# Patient Record
Sex: FEMALE | Race: WHITE | NOT HISPANIC OR LATINO | Employment: FULL TIME | URBAN - METROPOLITAN AREA
[De-identification: names, ages, dates, MRNs, and addresses within clinical notes are randomized per-mention and may not be internally consistent; named-entity substitution may affect disease eponyms.]

---

## 2023-03-10 ENCOUNTER — TELEMEDICINE (OUTPATIENT)
Dept: PRIMARY CARE | Facility: CLINIC | Age: 53
End: 2023-03-10
Payer: COMMERCIAL

## 2023-03-10 DIAGNOSIS — Q38.3 TONGUE ABNORMALITY: Primary | ICD-10-CM

## 2023-03-10 PROBLEM — I10 HYPERTENSION: Status: ACTIVE | Noted: 2023-03-10

## 2023-03-10 PROBLEM — H52.13 BILATERAL MYOPIA: Status: ACTIVE | Noted: 2023-03-10

## 2023-03-10 PROBLEM — R31.9 HEMATURIA: Status: ACTIVE | Noted: 2023-03-10

## 2023-03-10 PROBLEM — H57.04: Status: ACTIVE | Noted: 2023-03-10

## 2023-03-10 PROBLEM — E78.5 HYPERLIPIDEMIA: Status: ACTIVE | Noted: 2023-03-10

## 2023-03-10 PROBLEM — H52.203 ASTIGMATISM, BILATERAL: Status: ACTIVE | Noted: 2023-03-10

## 2023-03-10 PROBLEM — Q14.1 CONGENITAL HYPERTROPHY OF RETINAL PIGMENT EPITHELIUM: Status: ACTIVE | Noted: 2023-03-10

## 2023-03-10 PROBLEM — G54.0 NEUROGENIC THORACIC OUTLET SYNDROME: Status: ACTIVE | Noted: 2023-03-10

## 2023-03-10 PROBLEM — K21.9 LARYNGOPHARYNGEAL REFLUX (LPR): Status: ACTIVE | Noted: 2023-03-10

## 2023-03-10 PROBLEM — M99.09 SEGMENTAL AND SOMATIC DYSFUNCTION: Status: ACTIVE | Noted: 2023-03-10

## 2023-03-10 PROBLEM — G56.03 CARPAL TUNNEL SYNDROME, BILATERAL UPPER LIMBS: Status: ACTIVE | Noted: 2023-03-10

## 2023-03-10 PROBLEM — M99.01 CERVICOTHORACIC SOMATIC DYSFUNCTION: Status: ACTIVE | Noted: 2023-03-10

## 2023-03-10 PROBLEM — S03.40XA TMJ (SPRAIN OF TEMPOROMANDIBULAR JOINT): Status: ACTIVE | Noted: 2023-03-10

## 2023-03-10 PROBLEM — H35.9 RETINAL PIGMENT EPITHELIUM ABNORMALITY: Status: ACTIVE | Noted: 2023-03-10

## 2023-03-10 PROBLEM — R13.19 ESOPHAGEAL DYSPHAGIA: Status: ACTIVE | Noted: 2023-03-10

## 2023-03-10 PROBLEM — M54.2 CERVICALGIA: Status: ACTIVE | Noted: 2023-03-10

## 2023-03-10 PROCEDURE — 99213 OFFICE O/P EST LOW 20 MIN: CPT | Performed by: STUDENT IN AN ORGANIZED HEALTH CARE EDUCATION/TRAINING PROGRAM

## 2023-03-10 RX ORDER — BUPROPION HYDROCHLORIDE 300 MG/1
1 TABLET ORAL DAILY
COMMUNITY
Start: 2018-01-11 | End: 2023-12-19 | Stop reason: WASHOUT

## 2023-03-10 RX ORDER — LOSARTAN POTASSIUM 50 MG/1
1 TABLET ORAL DAILY
COMMUNITY
Start: 2015-01-27 | End: 2023-08-07 | Stop reason: SDUPTHER

## 2023-03-10 RX ORDER — ONABOTULINUMTOXINA 100 [USP'U]/1
1 INJECTION, POWDER, LYOPHILIZED, FOR SOLUTION INTRADERMAL; INTRAMUSCULAR ONCE
COMMUNITY
Start: 2022-01-26

## 2023-03-10 RX ORDER — ATORVASTATIN CALCIUM 10 MG/1
1 TABLET, FILM COATED ORAL DAILY
COMMUNITY
Start: 2019-07-29 | End: 2024-01-09 | Stop reason: ALTCHOICE

## 2023-03-10 RX ORDER — NYSTATIN 100000 [USP'U]/ML
5 SUSPENSION ORAL 3 TIMES DAILY
Qty: 280 ML | Refills: 0 | Status: SHIPPED | OUTPATIENT
Start: 2023-03-10 | End: 2023-03-24

## 2023-03-10 RX ORDER — LEVONORGESTREL 52 MG/1
1 INTRAUTERINE DEVICE INTRAUTERINE ONCE
COMMUNITY

## 2023-03-10 ASSESSMENT — ENCOUNTER SYMPTOMS
FATIGUE: 0
APPETITE CHANGE: 0
VOICE CHANGE: 0
CHEST TIGHTNESS: 0
SORE THROAT: 0
FEVER: 0
UNEXPECTED WEIGHT CHANGE: 0
SHORTNESS OF BREATH: 0
FACIAL SWELLING: 0
ACTIVITY CHANGE: 0
TROUBLE SWALLOWING: 0
CHOKING: 0

## 2023-03-10 NOTE — PROGRESS NOTES
Subjective   Patient ID: Eveline Ruff is a 52 y.o. female who presents for No chief complaint on file..    HPI     53 yo female presents with 1 week of white coating on tongue    Is not taking any steroids  No new medications  Is not immunocompromised  No itching or pain  No dental concerns    Review of Systems   Constitutional:  Negative for activity change, appetite change, fatigue, fever and unexpected weight change.   HENT:  Negative for facial swelling, mouth sores, sore throat, trouble swallowing and voice change.         White tongue   Eyes:  Negative for visual disturbance.   Respiratory:  Negative for choking, chest tightness and shortness of breath.    Allergic/Immunologic: Negative for immunocompromised state.       Objective   There were no vitals taken for this visit.    Physical Exam  Constitutional:       Appearance: Normal appearance.   HENT:      Mouth/Throat:      Pharynx: No oropharyngeal exudate or posterior oropharyngeal erythema.      Comments: Small white coloring to mid posterior tongue  Patient is not able to scrape off  Pulmonary:      Effort: Pulmonary effort is normal. No respiratory distress.   Neurological:      General: No focal deficit present.      Mental Status: She is alert and oriented to person, place, and time.   Psychiatric:         Mood and Affect: Mood normal.         Behavior: Behavior normal.       Assessment/Plan     1. Tongue abnormality  Discussed with patient trial of nystatin to clear if this is indeed thrush, difficult with limited exam and poor video quality to see   Recommend follow up for in person eval if symptoms persist  Denies any systemic symptoms like fevers, weight loss, pain, decreased appetite  - nystatin (Mycostatin) 100,000 unit/mL suspension; Take 5 mL (500,000 Units) by mouth in the morning and 5 mL (500,000 Units) in the evening and 5 mL (500,000 Units) before bedtime. Do all this for 14 days. Swish in mouth and swallow..  Dispense: 280 mL; Refill:  0

## 2023-03-15 ENCOUNTER — OFFICE VISIT (OUTPATIENT)
Dept: PRIMARY CARE | Facility: CLINIC | Age: 53
End: 2023-03-15
Payer: COMMERCIAL

## 2023-03-15 VITALS
SYSTOLIC BLOOD PRESSURE: 138 MMHG | DIASTOLIC BLOOD PRESSURE: 77 MMHG | BODY MASS INDEX: 24.35 KG/M2 | TEMPERATURE: 98.8 F | WEIGHT: 129 LBS | HEART RATE: 100 BPM | HEIGHT: 61 IN

## 2023-03-15 DIAGNOSIS — B37.0 ORAL THRUSH: Primary | ICD-10-CM

## 2023-03-15 PROCEDURE — 3075F SYST BP GE 130 - 139MM HG: CPT | Performed by: INTERNAL MEDICINE

## 2023-03-15 PROCEDURE — 3078F DIAST BP <80 MM HG: CPT | Performed by: INTERNAL MEDICINE

## 2023-03-15 PROCEDURE — 1036F TOBACCO NON-USER: CPT | Performed by: INTERNAL MEDICINE

## 2023-03-15 PROCEDURE — 99213 OFFICE O/P EST LOW 20 MIN: CPT | Performed by: INTERNAL MEDICINE

## 2023-03-15 ASSESSMENT — ENCOUNTER SYMPTOMS
WOUND: 0
ADENOPATHY: 0
VOMITING: 0
PALPITATIONS: 0
HALLUCINATIONS: 0
NECK PAIN: 0
BLOOD IN STOOL: 0
EYE PAIN: 0
TROUBLE SWALLOWING: 0
CHEST TIGHTNESS: 0
FEVER: 0
NAUSEA: 0
DIZZINESS: 0
STRIDOR: 0
SORE THROAT: 0
PHOTOPHOBIA: 0
SHORTNESS OF BREATH: 0
APPETITE CHANGE: 0
DIARRHEA: 0
SPEECH DIFFICULTY: 0
FACIAL ASYMMETRY: 0
ABDOMINAL PAIN: 0
SINUS PAIN: 0
POLYPHAGIA: 0
CONSTIPATION: 0
HEADACHES: 0
ACTIVITY CHANGE: 0
DYSURIA: 0
VOICE CHANGE: 0
NERVOUS/ANXIOUS: 0
WHEEZING: 0
CONFUSION: 0
WEAKNESS: 0
UNEXPECTED WEIGHT CHANGE: 0
SLEEP DISTURBANCE: 0
SEIZURES: 0
FLANK PAIN: 0
MYALGIAS: 0
FATIGUE: 0
BACK PAIN: 0
NUMBNESS: 0
POLYDIPSIA: 0

## 2023-03-15 ASSESSMENT — PAIN SCALES - GENERAL: PAINLEVEL: 0-NO PAIN

## 2023-03-15 NOTE — PROGRESS NOTES
"Subjective   Patient ID: Eveline Ruff is a 53 y.o. female who presents for Thrush.    HPI   53 year old female who consulted telemedicine few days ago and was treated with Nystatin for oral thrush. She never had it before and presented today for follow up to make sure there is no problem left.    Review of Systems   Constitutional:  Negative for activity change, appetite change, fatigue, fever and unexpected weight change.   HENT:  Negative for dental problem, ear discharge, hearing loss, nosebleeds, postnasal drip, sinus pain, sore throat, trouble swallowing and voice change.    Eyes:  Negative for photophobia, pain and visual disturbance.   Respiratory:  Negative for chest tightness, shortness of breath, wheezing and stridor.    Cardiovascular:  Negative for chest pain, palpitations and leg swelling.   Gastrointestinal:  Negative for abdominal pain, blood in stool, constipation, diarrhea, nausea and vomiting.   Endocrine: Negative for polydipsia, polyphagia and polyuria.   Genitourinary:  Negative for decreased urine volume, dyspareunia, dysuria, flank pain and urgency.   Musculoskeletal:  Negative for back pain, gait problem, myalgias and neck pain.   Skin:  Negative for rash and wound.   Allergic/Immunologic: Negative for environmental allergies and food allergies.   Neurological:  Negative for dizziness, seizures, syncope, facial asymmetry, speech difficulty, weakness, numbness and headaches.   Hematological:  Negative for adenopathy.   Psychiatric/Behavioral:  Negative for behavioral problems, confusion, hallucinations, sleep disturbance and suicidal ideas. The patient is not nervous/anxious.        Objective   /77 (BP Location: Right arm, Patient Position: Sitting, BP Cuff Size: Adult)   Pulse 100   Temp 37.1 °C (98.8 °F)   Ht 1.549 m (5' 1\")   Wt 58.5 kg (129 lb)   BMI 24.37 kg/m²     Physical Exam  Constitutional:       Appearance: Normal appearance.   HENT:      Mouth/Throat:      Mouth: " Mucous membranes are moist.      Pharynx: No oropharyngeal exudate.   Eyes:      Pupils: Pupils are equal, round, and reactive to light.   Cardiovascular:      Rate and Rhythm: Normal rate.   Pulmonary:      Effort: Pulmonary effort is normal.   Neurological:      General: No focal deficit present.      Mental Status: She is alert and oriented to person, place, and time.   Psychiatric:         Mood and Affect: Mood normal.         Behavior: Behavior normal.         Thought Content: Thought content normal.         Judgment: Judgment normal.     Oropharyngeal exam is suggestive of slightly erythematous tongue but it can be normal as I didn't evaluate patient in past. No white coating on the tongue and few mucosal erosion spot bilaterally on lateral side of tongue but they are not ulcer and can be due to left over erosion from recent thrush.    Assessment/Plan   Problem List Items Addressed This Visit    None  Visit Diagnoses       Oral thrush    -  Primary     Patient asked to do Nystatin for 3 days more but it seems to be healed up and mucosal erosion will improve in few days but I cannot see anything concerning at this time.

## 2023-05-13 LAB — URINE CULTURE: NORMAL

## 2023-05-19 LAB
ANION GAP IN SER/PLAS: 12 MMOL/L (ref 10–20)
CALCIUM (MG/DL) IN SER/PLAS: 10.1 MG/DL (ref 8.6–10.6)
CARBON DIOXIDE, TOTAL (MMOL/L) IN SER/PLAS: 29 MMOL/L (ref 21–32)
CHLORIDE (MMOL/L) IN SER/PLAS: 104 MMOL/L (ref 98–107)
CREATININE (MG/DL) IN SER/PLAS: 0.84 MG/DL (ref 0.5–1.05)
GFR FEMALE: 83 ML/MIN/1.73M2
GLUCOSE (MG/DL) IN SER/PLAS: 81 MG/DL (ref 74–99)
POTASSIUM (MMOL/L) IN SER/PLAS: 4.2 MMOL/L (ref 3.5–5.3)
SODIUM (MMOL/L) IN SER/PLAS: 141 MMOL/L (ref 136–145)
UREA NITROGEN (MG/DL) IN SER/PLAS: 12 MG/DL (ref 6–23)

## 2023-06-16 RX ORDER — CIPROFLOXACIN 500 MG/1
150 TABLET ORAL DAILY
COMMUNITY
Start: 2023-06-15 | End: 2023-06-16

## 2023-08-07 DIAGNOSIS — I10 HYPERTENSION, UNSPECIFIED TYPE: ICD-10-CM

## 2023-08-08 RX ORDER — LOSARTAN POTASSIUM 50 MG/1
50 TABLET ORAL DAILY
Qty: 90 TABLET | Refills: 3 | Status: SHIPPED | OUTPATIENT
Start: 2023-08-08 | End: 2023-08-08

## 2023-11-04 ENCOUNTER — PHARMACY VISIT (OUTPATIENT)
Dept: PHARMACY | Facility: CLINIC | Age: 53
End: 2023-11-04
Payer: COMMERCIAL

## 2023-11-04 PROCEDURE — RXMED WILLOW AMBULATORY MEDICATION CHARGE

## 2023-11-09 ENCOUNTER — ALLIED HEALTH (OUTPATIENT)
Dept: INTEGRATIVE MEDICINE | Facility: CLINIC | Age: 53
End: 2023-11-09
Payer: COMMERCIAL

## 2023-11-09 DIAGNOSIS — M54.59 MECHANICAL LOW BACK PAIN: ICD-10-CM

## 2023-11-09 DIAGNOSIS — M99.04 SEGMENTAL AND SOMATIC DYSFUNCTION OF SACRAL REGION: ICD-10-CM

## 2023-11-09 DIAGNOSIS — M89.8X1 PERISCAPULAR PAIN: ICD-10-CM

## 2023-11-09 DIAGNOSIS — M99.02 SEGMENTAL AND SOMATIC DYSFUNCTION OF THORACIC REGION: ICD-10-CM

## 2023-11-09 DIAGNOSIS — M79.10 MYALGIA: ICD-10-CM

## 2023-11-09 DIAGNOSIS — M99.03 SEGMENTAL AND SOMATIC DYSFUNCTION OF LUMBAR REGION: Primary | ICD-10-CM

## 2023-11-09 DIAGNOSIS — M79.9 POSTURAL STRAIN: ICD-10-CM

## 2023-11-09 PROCEDURE — 98941 CHIROPRACT MANJ 3-4 REGIONS: CPT | Performed by: CHIROPRACTOR

## 2023-11-09 PROCEDURE — 97112 NEUROMUSCULAR REEDUCATION: CPT | Performed by: CHIROPRACTOR

## 2023-11-09 NOTE — PROGRESS NOTES
Subjective   Patient ID: Eveline Ruff is a 53 y.o. female who presents November 9, 2023 for neck pain and low back pain.    (3/20) VPCY    Today, the patient rates their degree of pain as a 3 out of 10 on the numeric pain rating scale.     Eveline returns for continued chiropractic care. She reports that she has noticed increased discomfort in her low back. She denies any mechanism of injury, radiating pain, numbness/tingling, or bowel/bladder dysfunction. The patient denies any changes in health since her last encounter and will return in 1 week.   ________________________________________________________________  INITIAL HISTORY (07/02/2020):  Neck pain - 2 month worsening of constant 5/10 NRS Neck pain, radiates into BL entire UEs with tingling mostly at night, or occasionally during day. No trauma, numbness. SLeeps with arms/hands up near pillow. Tingling in arms/hands is chronic, occurred at least 2+ years. Saw Ortho, was Dx with CTS via electrodiagnostics, and had injections which reportedly helped. Notes neck pain is new addition to the numbness symptoms. Relates being working from home due to pandemic, possibly not having as comfortable of a chair.       Objective   Physical Exam  Neurological:      General: No focal deficit present.      Mental Status: She is alert and oriented to person, place, and time.      Cranial Nerves: No dysarthria or facial asymmetry.      Sensory: Sensation is intact.      Motor: Motor function is intact.      Coordination: Coordination is intact.      Gait: Gait is intact.     Palpation of the following region(s) revealed:      Lumbar: Lumbar paraspinals bilateral, muscular hypertonicity.  Quadratus lumborum bilateral, muscular hypertonicity.  Gluteal right, hypertonicity and tenderness.  Piriformis right, hypertonicity and tenderness.  Psoas right, muscular hypertonicity.        Segmental Joint(s): Segmental joint dysfunction was assessed with motion palpation and is identified  in the following areas:  Thoracic : T4, T5, and T7  Lumbopelvic / Sacral SIJ : L4, L5/S1, R SIJ, and L SIJ      Assessment/Plan   Today's Treatment Included: Chiropractic manipulation to the  Segmental Joint(s) Lumbopelvic/Sacral SIJ : L4, L5/S1, R SIJ, and L SIJ Segmental Joint(s) Thoracic : T4, T5, and T7   Treatment Techniques Used : Diversified CMT and Pelvic drop table technique  Neuromuscular Re-Education (72276): Start time: 4:10 pm End time: 4:50 pm  2 Units  Active release  Cupping    Soft-tissue mobilization was performed in the following areas:       Lumbar Paraspinal mm. bilateral, Quadratus Lumborum bilateral, Gluteal mm. Glute. Max.  and Glute. Med. right, Piriformis right, and Psoas right            The patient tolerated today's treatment with little or no additional discomfort and was instructed to contact the office for questions or concerns.

## 2023-11-13 DIAGNOSIS — E78.5 HYPERLIPIDEMIA, UNSPECIFIED HYPERLIPIDEMIA TYPE: ICD-10-CM

## 2023-11-13 DIAGNOSIS — Z00.00 ANNUAL PHYSICAL EXAM: ICD-10-CM

## 2023-11-13 DIAGNOSIS — I10 HYPERTENSION, UNSPECIFIED TYPE: ICD-10-CM

## 2023-11-13 DIAGNOSIS — R31.9 HEMATURIA, UNSPECIFIED TYPE: ICD-10-CM

## 2023-11-20 ENCOUNTER — ALLIED HEALTH (OUTPATIENT)
Dept: INTEGRATIVE MEDICINE | Facility: CLINIC | Age: 53
End: 2023-11-20
Payer: COMMERCIAL

## 2023-11-20 DIAGNOSIS — M79.9 POSTURAL STRAIN: ICD-10-CM

## 2023-11-20 DIAGNOSIS — M89.8X1 PERISCAPULAR PAIN: ICD-10-CM

## 2023-11-20 DIAGNOSIS — M54.59 MECHANICAL LOW BACK PAIN: ICD-10-CM

## 2023-11-20 DIAGNOSIS — M79.10 MYALGIA: ICD-10-CM

## 2023-11-20 DIAGNOSIS — M99.03 SEGMENTAL AND SOMATIC DYSFUNCTION OF LUMBAR REGION: Primary | ICD-10-CM

## 2023-11-20 DIAGNOSIS — M99.02 SEGMENTAL AND SOMATIC DYSFUNCTION OF THORACIC REGION: ICD-10-CM

## 2023-11-20 DIAGNOSIS — M99.04 SEGMENTAL AND SOMATIC DYSFUNCTION OF SACRAL REGION: ICD-10-CM

## 2023-11-20 PROCEDURE — 97112 NEUROMUSCULAR REEDUCATION: CPT | Performed by: CHIROPRACTOR

## 2023-11-20 PROCEDURE — 98941 CHIROPRACT MANJ 3-4 REGIONS: CPT | Performed by: CHIROPRACTOR

## 2023-11-20 NOTE — PROGRESS NOTES
Subjective   Patient ID: Eveline Ruff is a 53 y.o. female who presents November 20, 2023 for neck pain and low back pain.    (4/20) VPCY    Today, the patient rates their degree of pain as a 3 out of 10 on the numeric pain rating scale.     Eveline returns for continued chiropractic care. She states that she noticed temporary relief following her last visit. She states that she continues to have low back pain. The patient denies any changes in health since her last encounter and will return in 1 week.   ________________________________________________________________  INITIAL HISTORY (07/02/2020):  Neck pain - 2 month worsening of constant 5/10 NRS Neck pain, radiates into BL entire UEs with tingling mostly at night, or occasionally during day. No trauma, numbness. SLeeps with arms/hands up near pillow. Tingling in arms/hands is chronic, occurred at least 2+ years. Saw Ortho, was Dx with CTS via electrodiagnostics, and had injections which reportedly helped. Notes neck pain is new addition to the numbness symptoms. Relates being working from home due to pandemic, possibly not having as comfortable of a chair.       Objective   Physical Exam  Neurological:      General: No focal deficit present.      Mental Status: She is alert and oriented to person, place, and time.      Cranial Nerves: No dysarthria or facial asymmetry.      Sensory: Sensation is intact.      Motor: Motor function is intact.      Coordination: Coordination is intact.      Gait: Gait is intact.       Palpation of the following region(s) revealed:  Lumbar: Lumbar paraspinals bilateral, muscular hypertonicity.  Quadratus lumborum bilateral, muscular hypertonicity.  Gluteal right, hypertonicity and tenderness.  Piriformis right, hypertonicity and tenderness.  Psoas right, muscular hypertonicity.    Segmental Joint(s): Segmental joint dysfunction was assessed with motion palpation and is identified in the following areas:  Thoracic : T4, T5, and  T7  Lumbopelvic / Sacral SIJ : L4, L5/S1, R SIJ, and L SIJ    Assessment/Plan   Today's Treatment Included: Chiropractic manipulation to the  Segmental Joint(s) Lumbopelvic/Sacral SIJ : L4, L5/S1, R SIJ, and L SIJ Segmental Joint(s) Thoracic : T4, T5, and T7   Treatment Techniques Used : Diversified CMT and Pelvic drop table technique  Neuromuscular Re-Education (60946): Start time: 4:40 pm End time: 4:50 pm  1 Units  Active release  Cupping    Soft-tissue mobilization was performed in the following areas:   Lumbar Paraspinal mm. bilateral, Quadratus Lumborum bilateral, Gluteal mm. Glute. Max.  and Glute. Med. right, Piriformis right, and Psoas right    The patient tolerated today's treatment with little or no additional discomfort and was instructed to contact the office for questions or concerns.

## 2023-12-01 ENCOUNTER — PHARMACY VISIT (OUTPATIENT)
Dept: PHARMACY | Facility: CLINIC | Age: 53
End: 2023-12-01
Payer: COMMERCIAL

## 2023-12-01 PROCEDURE — RXMED WILLOW AMBULATORY MEDICATION CHARGE

## 2023-12-18 ENCOUNTER — ANCILLARY PROCEDURE (OUTPATIENT)
Dept: RADIOLOGY | Facility: CLINIC | Age: 53
End: 2023-12-18
Payer: COMMERCIAL

## 2023-12-18 ENCOUNTER — APPOINTMENT (OUTPATIENT)
Dept: UROLOGY | Facility: HOSPITAL | Age: 53
End: 2023-12-18
Payer: COMMERCIAL

## 2023-12-18 DIAGNOSIS — Z12.31 ENCOUNTER FOR SCREENING MAMMOGRAM FOR MALIGNANT NEOPLASM OF BREAST: ICD-10-CM

## 2023-12-18 PROCEDURE — 77067 SCR MAMMO BI INCL CAD: CPT | Performed by: RADIOLOGY

## 2023-12-18 PROCEDURE — 77067 SCR MAMMO BI INCL CAD: CPT

## 2023-12-18 PROCEDURE — 77063 BREAST TOMOSYNTHESIS BI: CPT | Performed by: RADIOLOGY

## 2023-12-19 ENCOUNTER — OFFICE VISIT (OUTPATIENT)
Dept: OBSTETRICS AND GYNECOLOGY | Facility: CLINIC | Age: 53
End: 2023-12-19
Payer: COMMERCIAL

## 2023-12-19 VITALS
DIASTOLIC BLOOD PRESSURE: 75 MMHG | WEIGHT: 138 LBS | BODY MASS INDEX: 26.06 KG/M2 | SYSTOLIC BLOOD PRESSURE: 123 MMHG | HEIGHT: 61 IN

## 2023-12-19 DIAGNOSIS — Z01.419 WELL WOMAN EXAM: Primary | ICD-10-CM

## 2023-12-19 DIAGNOSIS — Z30.431 ENCOUNTER FOR ROUTINE CHECKING OF INTRAUTERINE CONTRACEPTIVE DEVICE (IUD): ICD-10-CM

## 2023-12-19 PROCEDURE — 99396 PREV VISIT EST AGE 40-64: CPT | Performed by: OBSTETRICS & GYNECOLOGY

## 2023-12-19 PROCEDURE — 3078F DIAST BP <80 MM HG: CPT | Performed by: OBSTETRICS & GYNECOLOGY

## 2023-12-19 PROCEDURE — 3074F SYST BP LT 130 MM HG: CPT | Performed by: OBSTETRICS & GYNECOLOGY

## 2023-12-19 PROCEDURE — 1036F TOBACCO NON-USER: CPT | Performed by: OBSTETRICS & GYNECOLOGY

## 2023-12-19 RX ORDER — BUPROPION HYDROCHLORIDE 300 MG/1
300 TABLET ORAL DAILY
COMMUNITY
End: 2024-01-09 | Stop reason: SDUPTHER

## 2023-12-19 ASSESSMENT — ENCOUNTER SYMPTOMS
FREQUENCY: 0
DYSURIA: 0
CHILLS: 0
SHORTNESS OF BREATH: 0
APPETITE CHANGE: 0
HEMATURIA: 0
BACK PAIN: 0
UNEXPECTED WEIGHT CHANGE: 0
ABDOMINAL PAIN: 0
COLOR CHANGE: 0
FEVER: 0
SLEEP DISTURBANCE: 0
ABDOMINAL DISTENTION: 0
VOMITING: 0
FATIGUE: 0
FLANK PAIN: 0
DIARRHEA: 0
NAUSEA: 0
BLOOD IN STOOL: 0
CONSTIPATION: 0

## 2023-12-19 ASSESSMENT — PAIN SCALES - GENERAL: PAINLEVEL: 0-NO PAIN

## 2023-12-19 NOTE — PROGRESS NOTES
"Eveline Ruff is a 53 y.o.  here for well woman exam.    Concerns: none  Exercise: typically regular exercise but taking a break recently d/t back spasms      GynHx:  Cycles: none  Sexually active: yes with one partner  Contraception: Mirena IUD, placed 2020   No LMP recorded (lmp unknown).       OB History          0    Para   0    Term   0       0    AB   0    Living   0         SAB   0    IAB   0    Ectopic   0    Multiple   0    Live Births   0               Past med hx and past surg hx reviewed and notable for: no new issues    Objective   /75   Ht 1.537 m (5' 0.5\")   Wt 62.6 kg (138 lb)   LMP  (LMP Unknown) Comment: iud  BMI 26.51 kg/m²     Review of Systems   Constitutional:  Negative for appetite change, chills, fatigue, fever and unexpected weight change.   Respiratory:  Negative for shortness of breath.    Cardiovascular:  Negative for chest pain.   Gastrointestinal:  Negative for abdominal distention, abdominal pain, blood in stool, constipation, diarrhea, nausea and vomiting.   Endocrine: Negative for cold intolerance and heat intolerance.   Genitourinary:  Negative for dyspareunia, dysuria, flank pain, frequency, genital sores, hematuria, menstrual problem, pelvic pain, urgency, vaginal bleeding, vaginal discharge and vaginal pain.   Musculoskeletal:  Negative for back pain.   Skin:  Negative for color change.   Psychiatric/Behavioral:  Negative for sleep disturbance.        Physical Exam  Constitutional:       Appearance: Normal appearance.   HENT:      Head: Normocephalic and atraumatic.   Chest:   Breasts:     Right: Normal.      Left: Normal.   Abdominal:      General: Abdomen is flat.      Palpations: Abdomen is soft.      Tenderness: There is no abdominal tenderness.   Genitourinary:     General: Normal vulva.      Vagina: Normal.      Cervix: Normal.      Uterus: Normal.       Adnexa: Right adnexa normal and left adnexa normal.      Comments: +IUD thread seen "   Skin:     General: Skin is warm and dry.   Neurological:      Mental Status: She is alert and oriented to person, place, and time.   Psychiatric:         Mood and Affect: Mood normal.          Assessment and Plan:  Routine Well Woman Exam Today.   Discussed diet and exercise and routine health screening.   Pap: 2021 wnl   Recommend annual mammograms.  Pt had mammogram 12/18/23.  Cologuard colon cancer screening 2021.         No orders of the defined types were placed in this encounter.

## 2023-12-22 ENCOUNTER — LAB (OUTPATIENT)
Dept: LAB | Facility: LAB | Age: 53
End: 2023-12-22
Payer: COMMERCIAL

## 2023-12-22 DIAGNOSIS — I10 HYPERTENSION, UNSPECIFIED TYPE: ICD-10-CM

## 2023-12-22 DIAGNOSIS — E78.5 HYPERLIPIDEMIA, UNSPECIFIED HYPERLIPIDEMIA TYPE: ICD-10-CM

## 2023-12-22 DIAGNOSIS — R31.9 HEMATURIA, UNSPECIFIED TYPE: ICD-10-CM

## 2023-12-22 DIAGNOSIS — Z00.00 ANNUAL PHYSICAL EXAM: ICD-10-CM

## 2023-12-22 LAB
25(OH)D3 SERPL-MCNC: 27 NG/ML (ref 30–100)
ALBUMIN SERPL BCP-MCNC: 4.4 G/DL (ref 3.4–5)
ALP SERPL-CCNC: 64 U/L (ref 33–110)
ALT SERPL W P-5'-P-CCNC: 49 U/L (ref 7–45)
ANION GAP SERPL CALC-SCNC: 13 MMOL/L (ref 10–20)
AST SERPL W P-5'-P-CCNC: 28 U/L (ref 9–39)
BASOPHILS # BLD AUTO: 0.03 X10*3/UL (ref 0–0.1)
BASOPHILS NFR BLD AUTO: 0.4 %
BILIRUB SERPL-MCNC: 0.7 MG/DL (ref 0–1.2)
BUN SERPL-MCNC: 9 MG/DL (ref 6–23)
CALCIUM SERPL-MCNC: 9.6 MG/DL (ref 8.6–10.6)
CHLORIDE SERPL-SCNC: 104 MMOL/L (ref 98–107)
CHOLEST SERPL-MCNC: 226 MG/DL (ref 0–199)
CHOLESTEROL/HDL RATIO: 3.5
CO2 SERPL-SCNC: 29 MMOL/L (ref 21–32)
CREAT SERPL-MCNC: 0.95 MG/DL (ref 0.5–1.05)
EOSINOPHIL # BLD AUTO: 0.24 X10*3/UL (ref 0–0.7)
EOSINOPHIL NFR BLD AUTO: 3.4 %
ERYTHROCYTE [DISTWIDTH] IN BLOOD BY AUTOMATED COUNT: 12.8 % (ref 11.5–14.5)
EST. AVERAGE GLUCOSE BLD GHB EST-MCNC: 105 MG/DL
GFR SERPL CREATININE-BSD FRML MDRD: 72 ML/MIN/1.73M*2
GLUCOSE SERPL-MCNC: 85 MG/DL (ref 74–99)
HBA1C MFR BLD: 5.3 %
HCT VFR BLD AUTO: 43 % (ref 36–46)
HDLC SERPL-MCNC: 64.3 MG/DL
HGB BLD-MCNC: 14.2 G/DL (ref 12–16)
IMM GRANULOCYTES # BLD AUTO: 0.03 X10*3/UL (ref 0–0.7)
IMM GRANULOCYTES NFR BLD AUTO: 0.4 % (ref 0–0.9)
LDLC SERPL CALC-MCNC: 140 MG/DL
LYMPHOCYTES # BLD AUTO: 2.73 X10*3/UL (ref 1.2–4.8)
LYMPHOCYTES NFR BLD AUTO: 38.2 %
MCH RBC QN AUTO: 30.7 PG (ref 26–34)
MCHC RBC AUTO-ENTMCNC: 33 G/DL (ref 32–36)
MCV RBC AUTO: 93 FL (ref 80–100)
MONOCYTES # BLD AUTO: 0.39 X10*3/UL (ref 0.1–1)
MONOCYTES NFR BLD AUTO: 5.5 %
NEUTROPHILS # BLD AUTO: 3.73 X10*3/UL (ref 1.2–7.7)
NEUTROPHILS NFR BLD AUTO: 52.1 %
NON HDL CHOLESTEROL: 162 MG/DL (ref 0–149)
NRBC BLD-RTO: 0 /100 WBCS (ref 0–0)
PLATELET # BLD AUTO: 395 X10*3/UL (ref 150–450)
POTASSIUM SERPL-SCNC: 4.4 MMOL/L (ref 3.5–5.3)
PROT SERPL-MCNC: 7.2 G/DL (ref 6.4–8.2)
RBC # BLD AUTO: 4.62 X10*6/UL (ref 4–5.2)
SODIUM SERPL-SCNC: 142 MMOL/L (ref 136–145)
TRIGL SERPL-MCNC: 107 MG/DL (ref 0–149)
TSH SERPL-ACNC: 2.71 MIU/L (ref 0.44–3.98)
VLDL: 21 MG/DL (ref 0–40)
WBC # BLD AUTO: 7.2 X10*3/UL (ref 4.4–11.3)

## 2023-12-22 PROCEDURE — 80053 COMPREHEN METABOLIC PANEL: CPT

## 2023-12-22 PROCEDURE — 84443 ASSAY THYROID STIM HORMONE: CPT

## 2023-12-22 PROCEDURE — 82306 VITAMIN D 25 HYDROXY: CPT

## 2023-12-22 PROCEDURE — 83036 HEMOGLOBIN GLYCOSYLATED A1C: CPT

## 2023-12-22 PROCEDURE — 36415 COLL VENOUS BLD VENIPUNCTURE: CPT

## 2023-12-22 PROCEDURE — 81001 URINALYSIS AUTO W/SCOPE: CPT

## 2023-12-22 PROCEDURE — 80061 LIPID PANEL: CPT

## 2023-12-22 PROCEDURE — 85025 COMPLETE CBC W/AUTO DIFF WBC: CPT

## 2023-12-23 LAB
APPEARANCE UR: ABNORMAL
BACTERIA #/AREA URNS AUTO: ABNORMAL /HPF
BILIRUB UR STRIP.AUTO-MCNC: NEGATIVE MG/DL
COLOR UR: YELLOW
GLUCOSE UR STRIP.AUTO-MCNC: NEGATIVE MG/DL
KETONES UR STRIP.AUTO-MCNC: NEGATIVE MG/DL
LEUKOCYTE ESTERASE UR QL STRIP.AUTO: ABNORMAL
MUCOUS THREADS #/AREA URNS AUTO: ABNORMAL /LPF
NITRITE UR QL STRIP.AUTO: NEGATIVE
PH UR STRIP.AUTO: 6 [PH]
PROT UR STRIP.AUTO-MCNC: NEGATIVE MG/DL
RBC # UR STRIP.AUTO: NEGATIVE /UL
RBC #/AREA URNS AUTO: ABNORMAL /HPF
SP GR UR STRIP.AUTO: 1.01
SQUAMOUS #/AREA URNS AUTO: ABNORMAL /HPF
UROBILINOGEN UR STRIP.AUTO-MCNC: <2 MG/DL
WBC #/AREA URNS AUTO: ABNORMAL /HPF

## 2024-01-08 PROBLEM — L82.1 OTHER SEBORRHEIC KERATOSIS: Status: ACTIVE | Noted: 2023-07-06

## 2024-01-08 PROBLEM — D18.01 HEMANGIOMA OF SKIN AND SUBCUTANEOUS TISSUE: Status: ACTIVE | Noted: 2023-07-06

## 2024-01-08 PROBLEM — D23.9 OTHER BENIGN NEOPLASM OF SKIN, UNSPECIFIED: Status: ACTIVE | Noted: 2023-07-06

## 2024-01-08 PROBLEM — D22.5 MELANOCYTIC NEVI OF TRUNK: Status: ACTIVE | Noted: 2023-07-06

## 2024-01-08 PROBLEM — L81.4 OTHER MELANIN HYPERPIGMENTATION: Status: ACTIVE | Noted: 2023-07-06

## 2024-01-08 PROBLEM — L74.510 PRIMARY FOCAL HYPERHIDROSIS, AXILLA: Status: ACTIVE | Noted: 2023-07-06

## 2024-01-09 ENCOUNTER — OFFICE VISIT (OUTPATIENT)
Dept: PRIMARY CARE | Facility: CLINIC | Age: 54
End: 2024-01-09
Payer: COMMERCIAL

## 2024-01-09 VITALS
TEMPERATURE: 95.1 F | OXYGEN SATURATION: 96 % | DIASTOLIC BLOOD PRESSURE: 70 MMHG | HEART RATE: 94 BPM | SYSTOLIC BLOOD PRESSURE: 140 MMHG | WEIGHT: 136 LBS | HEIGHT: 60 IN | BODY MASS INDEX: 26.7 KG/M2

## 2024-01-09 DIAGNOSIS — Z00.00 ROUTINE GENERAL MEDICAL EXAMINATION AT A HEALTH CARE FACILITY: Primary | ICD-10-CM

## 2024-01-09 DIAGNOSIS — E78.2 MIXED HYPERLIPIDEMIA: ICD-10-CM

## 2024-01-09 DIAGNOSIS — I10 HYPERTENSION, UNSPECIFIED TYPE: ICD-10-CM

## 2024-01-09 DIAGNOSIS — I10 PRIMARY HYPERTENSION: ICD-10-CM

## 2024-01-09 PROCEDURE — 3078F DIAST BP <80 MM HG: CPT | Performed by: FAMILY MEDICINE

## 2024-01-09 PROCEDURE — 99396 PREV VISIT EST AGE 40-64: CPT | Performed by: FAMILY MEDICINE

## 2024-01-09 PROCEDURE — 3077F SYST BP >= 140 MM HG: CPT | Performed by: FAMILY MEDICINE

## 2024-01-09 PROCEDURE — 1036F TOBACCO NON-USER: CPT | Performed by: FAMILY MEDICINE

## 2024-01-09 ASSESSMENT — PAIN SCALES - GENERAL: PAINLEVEL: 0-NO PAIN

## 2024-01-09 NOTE — PROGRESS NOTES
Subjective   Reason for Visit: Eveline Ruff is an 53 y.o. female here for a Medicare Wellness visit.               HPI    Patient Care Team:  Betina Carver MD as PCP - General  Emily STEPHEN MD as PCP - Employee [employer] PCP     Review of Systems    Objective   Vitals:  /70   Pulse 94   Temp 35.1 °C (95.1 °F)   Ht 1.524 m (5')   Wt 61.7 kg (136 lb)   LMP  (LMP Unknown) Comment: iud  SpO2 96%   BMI 26.56 kg/m²       Physical Exam    Assessment/Plan   Problem List Items Addressed This Visit    None

## 2024-01-09 NOTE — PATIENT INSTRUCTIONS
It was nice to see you today!  Discussed current concerns and addressed   Reviewed recent labs and diagnostics  Reviewed medications list  Continue to eat a healthy diet, exercise at least 3 times a week or more  Plan and follow up discussed  For any further information related to your condition, copy and paste or go to [email or web address]  Calcium score at your convenience

## 2024-01-10 PROBLEM — Z00.00 ROUTINE GENERAL MEDICAL EXAMINATION AT A HEALTH CARE FACILITY: Status: ACTIVE | Noted: 2024-01-10

## 2024-01-10 PROBLEM — M54.2 CERVICALGIA: Status: RESOLVED | Noted: 2023-03-10 | Resolved: 2024-01-10

## 2024-01-10 RX ORDER — LOSARTAN POTASSIUM 50 MG/1
50 TABLET ORAL DAILY
Qty: 90 TABLET | Refills: 3 | Status: SHIPPED | OUTPATIENT
Start: 2024-01-10 | End: 2025-01-09

## 2024-01-10 RX ORDER — BUPROPION HYDROCHLORIDE 300 MG/1
300 TABLET ORAL DAILY
Qty: 90 TABLET | Refills: 3 | Status: SHIPPED | OUTPATIENT
Start: 2024-01-10 | End: 2025-01-09

## 2024-01-10 RX ORDER — AMLODIPINE BESYLATE 5 MG/1
TABLET ORAL
COMMUNITY
Start: 2022-02-14

## 2024-01-10 ASSESSMENT — ENCOUNTER SYMPTOMS
DIZZINESS: 0
VOMITING: 0
DIARRHEA: 0
SORE THROAT: 0
NUMBNESS: 0
UNEXPECTED WEIGHT CHANGE: 0
HEADACHES: 0
FEVER: 0
EYE PAIN: 0
WEAKNESS: 0
DYSURIA: 0
DECREASED CONCENTRATION: 0
HEMATURIA: 0
CONFUSION: 0
SHORTNESS OF BREATH: 0
NAUSEA: 0
PALPITATIONS: 0
JOINT SWELLING: 0
ABDOMINAL PAIN: 0
TROUBLE SWALLOWING: 0
FATIGUE: 0
BLOOD IN STOOL: 0
HALLUCINATIONS: 0
COUGH: 0
FREQUENCY: 0

## 2024-01-10 NOTE — PROGRESS NOTES
Subjective   Patient ID: Eveline Ruff is a 53 y.o. female.    Eveline Ruff comes in today for physical exam.  There has been no chest pain, shortness of breath, fever, chills, unexplained weight loss, rectal bleeding or any other unusual symptoms.  Recent labs, diagnostics and pertinent information has been reviewed. Patient is attempting to eat a healthy diet and incorporate exercise in to their lifestyle. Patient does not smoke. Alcohol in moderation. Patient is practicing routine eye and dental care. Reviewed employment status. No uncontrolled anxiety, depression though stress level is very high at work. Reviewed current medications, if any.          Review of Systems   Constitutional:  Negative for fatigue, fever and unexpected weight change.   HENT:  Negative for congestion, ear pain, hearing loss, sore throat and trouble swallowing.    Eyes:  Negative for pain and visual disturbance.   Respiratory:  Negative for cough and shortness of breath.    Cardiovascular:  Negative for chest pain, palpitations and leg swelling.   Gastrointestinal:  Negative for abdominal pain, blood in stool, diarrhea, nausea and vomiting.   Genitourinary:  Negative for dysuria, frequency, hematuria and urgency.   Musculoskeletal:  Negative for joint swelling.   Skin:  Negative for pallor and rash.   Neurological:  Negative for dizziness, syncope, weakness, numbness and headaches.   Psychiatric/Behavioral:  Negative for confusion, decreased concentration, hallucinations and suicidal ideas.      Vitals:    01/09/24 1503   BP: 140/70   Pulse: 94   Temp: 35.1 °C (95.1 °F)   SpO2: 96%      Objective   Physical Exam  Constitutional:       Appearance: Normal appearance.   HENT:      Head: Normocephalic and atraumatic.      Right Ear: Tympanic membrane and external ear normal.      Left Ear: Tympanic membrane and external ear normal.      Nose: Nose normal.      Mouth/Throat:      Mouth: Mucous membranes are moist.      Pharynx:  Oropharynx is clear. No oropharyngeal exudate.   Eyes:      Extraocular Movements: Extraocular movements intact.      Conjunctiva/sclera: Conjunctivae normal.      Pupils: Pupils are equal, round, and reactive to light.   Cardiovascular:      Rate and Rhythm: Normal rate and regular rhythm.      Heart sounds: Normal heart sounds.   Pulmonary:      Effort: Pulmonary effort is normal.      Breath sounds: Normal breath sounds.   Abdominal:      General: Abdomen is flat.      Palpations: Abdomen is soft. There is no mass.      Tenderness: There is no abdominal tenderness. There is no guarding.   Musculoskeletal:      Cervical back: Neck supple.   Lymphadenopathy:      Cervical: No cervical adenopathy.   Skin:     General: Skin is warm and dry.   Neurological:      General: No focal deficit present.      Mental Status: She is alert.   Psychiatric:         Mood and Affect: Mood normal.         Speech: Speech normal.         Behavior: Behavior normal.         Cognition and Memory: Cognition normal.         Assessment/Plan   Diagnoses and all orders for this visit:  Mixed hyperlipidemia  -     Lipid panel; Future  -     Hepatic function panel; Future  -     CT cardiac scoring wo IV contrast; Future  -     losartan (Cozaar) 50 mg tablet; TAKE 1 TABLET BY MOUTH ONCE DAILY  -     buPROPion XL (Wellbutrin XL) 300 mg 24 hr tablet; Take 1 tablet (300 mg) by mouth once daily. Do not crush, chew, or split.  Primary hypertension  -     losartan (Cozaar) 50 mg tablet; TAKE 1 TABLET BY MOUTH ONCE DAILY  -     buPROPion XL (Wellbutrin XL) 300 mg 24 hr tablet; Take 1 tablet (300 mg) by mouth once daily. Do not crush, chew, or split.  Hypertension, unspecified type  -     losartan (Cozaar) 50 mg tablet; TAKE 1 TABLET BY MOUTH ONCE DAILY

## 2024-01-11 ENCOUNTER — APPOINTMENT (OUTPATIENT)
Dept: INTEGRATIVE MEDICINE | Facility: CLINIC | Age: 54
End: 2024-01-11
Payer: COMMERCIAL

## 2024-01-11 PROCEDURE — RXMED WILLOW AMBULATORY MEDICATION CHARGE

## 2024-01-12 ENCOUNTER — PHARMACY VISIT (OUTPATIENT)
Dept: PHARMACY | Facility: CLINIC | Age: 54
End: 2024-01-12
Payer: COMMERCIAL

## 2024-01-15 ENCOUNTER — TELEPHONE (OUTPATIENT)
Dept: PRIMARY CARE | Facility: CLINIC | Age: 54
End: 2024-01-15
Payer: COMMERCIAL

## 2024-01-15 ENCOUNTER — PHARMACY VISIT (OUTPATIENT)
Dept: PHARMACY | Facility: CLINIC | Age: 54
End: 2024-01-15
Payer: COMMERCIAL

## 2024-01-15 DIAGNOSIS — I10 PRIMARY HYPERTENSION: ICD-10-CM

## 2024-01-15 DIAGNOSIS — E78.2 MIXED HYPERLIPIDEMIA: Primary | ICD-10-CM

## 2024-01-15 PROCEDURE — RXMED WILLOW AMBULATORY MEDICATION CHARGE

## 2024-01-15 RX ORDER — FLUTICASONE PROPIONATE 50 MCG
SPRAY, SUSPENSION (ML) NASAL
COMMUNITY
Start: 2021-09-09

## 2024-01-15 RX ORDER — NORETHINDRONE 0.35 MG/1
TABLET ORAL
COMMUNITY
Start: 2019-10-25

## 2024-01-15 RX ORDER — ROSUVASTATIN CALCIUM 5 MG/1
5 TABLET, COATED ORAL DAILY
Qty: 30 TABLET | Refills: 5 | Status: SHIPPED | OUTPATIENT
Start: 2024-01-15 | End: 2024-06-02 | Stop reason: SDUPTHER

## 2024-01-16 ENCOUNTER — ALLIED HEALTH (OUTPATIENT)
Dept: INTEGRATIVE MEDICINE | Facility: CLINIC | Age: 54
End: 2024-01-16
Payer: COMMERCIAL

## 2024-01-16 DIAGNOSIS — M54.2 CERVICALGIA: ICD-10-CM

## 2024-01-16 DIAGNOSIS — M79.9 POSTURAL STRAIN: ICD-10-CM

## 2024-01-16 DIAGNOSIS — M99.04 SEGMENTAL AND SOMATIC DYSFUNCTION OF SACRAL REGION: ICD-10-CM

## 2024-01-16 DIAGNOSIS — M79.10 MYALGIA: ICD-10-CM

## 2024-01-16 DIAGNOSIS — M99.01 SEGMENTAL AND SOMATIC DYSFUNCTION OF CERVICAL REGION: Primary | ICD-10-CM

## 2024-01-16 DIAGNOSIS — M99.02 SEGMENTAL AND SOMATIC DYSFUNCTION OF THORACIC REGION: ICD-10-CM

## 2024-01-16 DIAGNOSIS — M54.59 MECHANICAL LOW BACK PAIN: ICD-10-CM

## 2024-01-16 DIAGNOSIS — M99.03 SEGMENTAL AND SOMATIC DYSFUNCTION OF LUMBAR REGION: ICD-10-CM

## 2024-01-16 PROCEDURE — 98941 CHIROPRACT MANJ 3-4 REGIONS: CPT | Performed by: CHIROPRACTOR

## 2024-01-16 NOTE — PROGRESS NOTES
Subjective   Patient ID: Eveline Ruff is a 53 y.o. female who presents January 17, 2024 for neck pain and low back pain.    (1/20) VPCY    Today, the patient rates their degree of pain as a 3 out of 10 on the numeric pain rating scale.     Eveline returns for continued treatment of neck and low back pain. She states that she has increased neck pain and stiffness, mostly at the base of the skull, R>L, She states that she she woke up with symptoms in December. She states that they have improved since then. Denies any radicular symptoms. She states that she continues to have upper back tension and mild low back pain. Denies any associated symptoms or change in medical history since last visit and will follow up PRN.  ________________________________________________________________  INITIAL HISTORY (07/02/2020):  Neck pain - 2 month worsening of constant 5/10 NRS Neck pain, radiates into BL entire UEs with tingling mostly at night, or occasionally during day. No trauma, numbness. SLeeps with arms/hands up near pillow. Tingling in arms/hands is chronic, occurred at least 2+ years. Saw Ortho, was Dx with CTS via electrodiagnostics, and had injections which reportedly helped. Notes neck pain is new addition to the numbness symptoms. Relates being working from home due to pandemic, possibly not having as comfortable of a chair.       Objective   Physical Exam  Constitutional:       General: She is not in acute distress.     Appearance: Normal appearance.       HENT:      Head: Normocephalic and atraumatic.   Neurological:      General: No focal deficit present.      Mental Status: She is alert and oriented to person, place, and time.      Cranial Nerves: No dysarthria or facial asymmetry.      Sensory: Sensation is intact.      Motor: Motor function is intact.      Coordination: Coordination is intact.      Gait: Gait is intact.   Psychiatric:         Attention and Perception: Attention normal.         Mood and Affect: Mood  normal.         Speech: Speech normal.         Behavior: Behavior is cooperative.       Palpation of the following region(s) revealed:  Lumbar: Lumbar paraspinals bilateral, muscular hypertonicity.  Quadratus lumborum bilateral, muscular hypertonicity.  Gluteal right, hypertonicity and tenderness.  Piriformis right, hypertonicity and tenderness.  Psoas right, muscular hypertonicity.    Segmental Joint(s): Segmental joint dysfunction was assessed with motion palpation and is identified in the following areas:  Cervical : C0 C1 C4 C5  Thoracic : T4, T5, and T7  Lumbopelvic / Sacral SIJ : L4, L5/S1, R SIJ, and L SIJ    Assessment/Plan   Today's Treatment Included: Chiropractic manipulation to the Segmental Joint(s) Cervical : C0 C1 C4 C5 Segmental Joint(s) Lumbopelvic/Sacral SIJ : L4, L5, L5/S1, R SIJ, and L SIJ Segmental Joint(s) Thoracic : T4, T5, and T7   Treatment Techniques Used : Diversified CMT and Pelvic drop table technique  Cervical Manual Traction  Ischemic compression  Soft-Tissue manipulation      Soft-tissue mobilization was performed in the following areas:   Lumbar Paraspinal mm. bilateral, Quadratus Lumborum bilateral, Gluteal mm. Glute. Max.  and Glute. Med. right, and Piriformis right, Cervical Paraspinals bilateral, Suboccipitals bilateral.    The patient tolerated today's treatment with little or no additional discomfort and was instructed to contact the office for questions or concerns.

## 2024-02-05 ENCOUNTER — PHARMACY VISIT (OUTPATIENT)
Dept: PHARMACY | Facility: CLINIC | Age: 54
End: 2024-02-05
Payer: COMMERCIAL

## 2024-02-05 PROCEDURE — RXMED WILLOW AMBULATORY MEDICATION CHARGE

## 2024-02-12 PROCEDURE — RXMED WILLOW AMBULATORY MEDICATION CHARGE

## 2024-02-13 ENCOUNTER — PHARMACY VISIT (OUTPATIENT)
Dept: PHARMACY | Facility: CLINIC | Age: 54
End: 2024-02-13
Payer: COMMERCIAL

## 2024-02-24 ENCOUNTER — LAB REQUISITION (OUTPATIENT)
Dept: LAB | Facility: HOSPITAL | Age: 54
End: 2024-02-24
Payer: COMMERCIAL

## 2024-02-24 DIAGNOSIS — J02.9 ACUTE PHARYNGITIS, UNSPECIFIED: ICD-10-CM

## 2024-02-24 PROCEDURE — 87651 STREP A DNA AMP PROBE: CPT

## 2024-02-25 LAB — S PYO DNA THROAT QL NAA+PROBE: NOT DETECTED

## 2024-03-15 NOTE — PROGRESS NOTES
Subjective   Patient ID: Eveline Ruff is a 54 y.o. female who presents March 18, 2024 for neck pain and low back pain.    (2/20) VPCY    Today, the patient rates their degree of pain as a 3 out of 10 on the numeric pain rating scale.     Eveline returns for continued chiropractic care. She states that she has been doing well. She reports that she has been experiencing mild discomfort in the low back. The patient denies any changes in health since her last encounter and will return in 1 week.   ________________________________________________________________  INITIAL HISTORY (07/02/2020):  Neck pain - 2 month worsening of constant 5/10 NRS Neck pain, radiates into BL entire UEs with tingling mostly at night, or occasionally during day. No trauma, numbness. SLeeps with arms/hands up near pillow. Tingling in arms/hands is chronic, occurred at least 2+ years. Saw Ortho, was Dx with CTS via electrodiagnostics, and had injections which reportedly helped. Notes neck pain is new addition to the numbness symptoms. Relates being working from home due to pandemic, possibly not having as comfortable of a chair.       Objective   Physical Exam  Neurological:      General: No focal deficit present.      Mental Status: She is alert and oriented to person, place, and time.      Cranial Nerves: No dysarthria or facial asymmetry.      Sensory: Sensation is intact.      Motor: Motor function is intact.      Coordination: Coordination is intact.      Gait: Gait is intact.       Palpation of the following region(s) revealed:  Lumbar: Lumbar paraspinals bilateral, muscular hypertonicity.  Quadratus lumborum bilateral, muscular hypertonicity.  Gluteal right, hypertonicity and tenderness.  Piriformis right, hypertonicity and tenderness.  Psoas right, muscular hypertonicity.    Segmental Joint(s): Segmental joint dysfunction was assessed with motion palpation and is identified in the following areas:  Thoracic : T4, T5, and T7  Lumbopelvic  / Sacral SIJ : L4, L5/S1, R SIJ, and L SIJ    Assessment/Plan   Today's Treatment Included: Chiropractic manipulation to the Segmental Joint(s) Cervical : C2 C4 Segmental Joint(s) Lumbopelvic/Sacral SIJ : L4, L5/S1, R SIJ, and L SIJ Segmental Joint(s) Thoracic : T4, T5, and T7   Treatment Techniques Used : Diversified CMT and Pelvic drop table technique  Active release    Soft-tissue mobilization was performed in the following areas:   Lumbar Paraspinal mm. bilateral, Quadratus Lumborum bilateral, Gluteal mm. Glute. Max.  and Glute. Med. right, Piriformis right, and Psoas right    The patient tolerated today's treatment with little or no additional discomfort and was instructed to contact the office for questions or concerns.

## 2024-03-18 ENCOUNTER — ALLIED HEALTH (OUTPATIENT)
Dept: INTEGRATIVE MEDICINE | Facility: CLINIC | Age: 54
End: 2024-03-18
Payer: COMMERCIAL

## 2024-03-18 DIAGNOSIS — M54.2 CERVICALGIA: ICD-10-CM

## 2024-03-18 DIAGNOSIS — M99.01 SEGMENTAL AND SOMATIC DYSFUNCTION OF CERVICAL REGION: Primary | ICD-10-CM

## 2024-03-18 DIAGNOSIS — M79.9 POSTURAL STRAIN: ICD-10-CM

## 2024-03-18 DIAGNOSIS — M54.59 MECHANICAL LOW BACK PAIN: ICD-10-CM

## 2024-03-18 DIAGNOSIS — M99.03 SEGMENTAL AND SOMATIC DYSFUNCTION OF LUMBAR REGION: ICD-10-CM

## 2024-03-18 DIAGNOSIS — M99.04 SEGMENTAL AND SOMATIC DYSFUNCTION OF SACRAL REGION: ICD-10-CM

## 2024-03-18 DIAGNOSIS — M99.02 SEGMENTAL AND SOMATIC DYSFUNCTION OF THORACIC REGION: ICD-10-CM

## 2024-03-18 DIAGNOSIS — M79.10 MYALGIA: ICD-10-CM

## 2024-03-18 PROCEDURE — 98941 CHIROPRACT MANJ 3-4 REGIONS: CPT | Performed by: CHIROPRACTOR

## 2024-04-08 PROCEDURE — RXMED WILLOW AMBULATORY MEDICATION CHARGE

## 2024-04-12 ENCOUNTER — PHARMACY VISIT (OUTPATIENT)
Dept: PHARMACY | Facility: CLINIC | Age: 54
End: 2024-04-12
Payer: COMMERCIAL

## 2024-04-23 ENCOUNTER — ALLIED HEALTH (OUTPATIENT)
Dept: INTEGRATIVE MEDICINE | Facility: CLINIC | Age: 54
End: 2024-04-23
Payer: COMMERCIAL

## 2024-04-23 DIAGNOSIS — M99.04 SEGMENTAL AND SOMATIC DYSFUNCTION OF SACRAL REGION: ICD-10-CM

## 2024-04-23 DIAGNOSIS — M99.02 SEGMENTAL AND SOMATIC DYSFUNCTION OF THORACIC REGION: ICD-10-CM

## 2024-04-23 DIAGNOSIS — M99.01 SEGMENTAL AND SOMATIC DYSFUNCTION OF CERVICAL REGION: Primary | ICD-10-CM

## 2024-04-23 DIAGNOSIS — M79.9 POSTURAL STRAIN: ICD-10-CM

## 2024-04-23 DIAGNOSIS — M54.59 MECHANICAL LOW BACK PAIN: ICD-10-CM

## 2024-04-23 DIAGNOSIS — M54.2 CERVICALGIA: ICD-10-CM

## 2024-04-23 DIAGNOSIS — M99.03 SEGMENTAL AND SOMATIC DYSFUNCTION OF LUMBAR REGION: ICD-10-CM

## 2024-04-23 DIAGNOSIS — M79.10 MYALGIA: ICD-10-CM

## 2024-04-23 PROCEDURE — 98941 CHIROPRACT MANJ 3-4 REGIONS: CPT | Performed by: CHIROPRACTOR

## 2024-04-23 PROCEDURE — 97112 NEUROMUSCULAR REEDUCATION: CPT | Performed by: CHIROPRACTOR

## 2024-04-23 NOTE — PROGRESS NOTES
Subjective   Patient ID: Eveline Ruff is a 54 y.o. female who presents April 23, 2024 for neck pain and low back pain.    (2/20) VPCY    Today, the patient rates their degree of pain as a 3 out of 10 on the numeric pain rating scale.     Eveline returns for continued chiropractic care. She reports that she has been experiencing some discomfort in the low back and hip The patient denies any changes in health since her last encounter and will return in 1 week.   ________________________________________________________________  INITIAL HISTORY (07/02/2020):  Neck pain - 2 month worsening of constant 5/10 NRS Neck pain, radiates into BL entire UEs with tingling mostly at night, or occasionally during day. No trauma, numbness. SLeeps with arms/hands up near pillow. Tingling in arms/hands is chronic, occurred at least 2+ years. Saw Ortho, was Dx with CTS via electrodiagnostics, and had injections which reportedly helped. Notes neck pain is new addition to the numbness symptoms. Relates being working from home due to pandemic, possibly not having as comfortable of a chair.       Objective   Physical Exam  Neurological:      General: No focal deficit present.      Mental Status: She is alert and oriented to person, place, and time.      Cranial Nerves: No dysarthria or facial asymmetry.      Sensory: Sensation is intact.      Motor: Motor function is intact.      Coordination: Coordination is intact.      Gait: Gait is intact.       Palpation of the following region(s) revealed:  Lumbar: Lumbar paraspinals bilateral, muscular hypertonicity.  Quadratus lumborum bilateral, muscular hypertonicity.  Gluteal right, hypertonicity and tenderness.  Piriformis right, hypertonicity and tenderness.  Psoas right, muscular hypertonicity.    Segmental Joint(s): Segmental joint dysfunction was assessed with motion palpation and is identified in the following areas:  Thoracic : T4, T5, and T7  Lumbopelvic / Sacral SIJ : L4, L5/S1, R SIJ,  and L SIJ    Assessment/Plan   Today's Treatment Included: Chiropractic manipulation to the Segmental Joint(s) Cervical : C5 C6 Segmental Joint(s) Lumbopelvic/Sacral SIJ : L4, L5/S1, and R SIJ Segmental Joint(s) Thoracic : T4, T5, T6, and T10   Treatment Techniques Used : Pelvic drop table technique, Pelvic blocking technique, and Low force  Neuromuscular Re-Education (93577): Start time: 4:10 pm End time: 4:40 pm  2 Units  Active release    Soft-tissue mobilization was performed in the following areas:   Lumbar Paraspinal mm. bilateral, Quadratus Lumborum bilateral, Gluteal mm. Glute. Max.  and Glute. Med. right, Piriformis right, and Psoas right    The patient tolerated today's treatment with little or no additional discomfort and was instructed to contact the office for questions or concerns.

## 2024-05-07 PROCEDURE — RXMED WILLOW AMBULATORY MEDICATION CHARGE

## 2024-05-09 ENCOUNTER — PHARMACY VISIT (OUTPATIENT)
Dept: PHARMACY | Facility: CLINIC | Age: 54
End: 2024-05-09
Payer: COMMERCIAL

## 2024-05-20 ENCOUNTER — ALLIED HEALTH (OUTPATIENT)
Dept: INTEGRATIVE MEDICINE | Facility: CLINIC | Age: 54
End: 2024-05-20
Payer: COMMERCIAL

## 2024-05-20 DIAGNOSIS — M54.59 MECHANICAL LOW BACK PAIN: ICD-10-CM

## 2024-05-20 DIAGNOSIS — M79.9 POSTURAL STRAIN: ICD-10-CM

## 2024-05-20 DIAGNOSIS — M54.2 CERVICALGIA: ICD-10-CM

## 2024-05-20 DIAGNOSIS — M99.01 SEGMENTAL AND SOMATIC DYSFUNCTION OF CERVICAL REGION: Primary | ICD-10-CM

## 2024-05-20 DIAGNOSIS — M99.04 SEGMENTAL AND SOMATIC DYSFUNCTION OF SACRAL REGION: ICD-10-CM

## 2024-05-20 DIAGNOSIS — M99.02 SEGMENTAL AND SOMATIC DYSFUNCTION OF THORACIC REGION: ICD-10-CM

## 2024-05-20 DIAGNOSIS — M99.03 SEGMENTAL AND SOMATIC DYSFUNCTION OF LUMBAR REGION: ICD-10-CM

## 2024-05-20 DIAGNOSIS — M79.10 MYALGIA: ICD-10-CM

## 2024-05-20 PROCEDURE — 98941 CHIROPRACT MANJ 3-4 REGIONS: CPT | Performed by: CHIROPRACTOR

## 2024-05-20 NOTE — PROGRESS NOTES
Subjective   Patient ID: Eveline Ruff is a 54 y.o. female who presents May 20, 2024 for neck pain and low back pain.    (3/20) VPCY    Today, the patient rates their degree of pain as a 3 out of 10 on the numeric pain rating scale.     Eveline returns for continued chiropractic care. She reports that she has been doing well. She states that last week, she was experiencing some tail bone pain but those symptoms have improved this week. The patient denies any changes in health since her last encounter and will return in 1 week.   ________________________________________________________________  INITIAL HISTORY (07/02/2020):  Neck pain - 2 month worsening of constant 5/10 NRS Neck pain, radiates into BL entire UEs with tingling mostly at night, or occasionally during day. No trauma, numbness. SLeeps with arms/hands up near pillow. Tingling in arms/hands is chronic, occurred at least 2+ years. Saw Ortho, was Dx with CTS via electrodiagnostics, and had injections which reportedly helped. Notes neck pain is new addition to the numbness symptoms. Relates being working from home due to pandemic, possibly not having as comfortable of a chair.       Objective   Physical Exam  Neurological:      General: No focal deficit present.      Mental Status: She is alert and oriented to person, place, and time.      Cranial Nerves: No dysarthria or facial asymmetry.      Sensory: Sensation is intact.      Motor: Motor function is intact.      Coordination: Coordination is intact.      Gait: Gait is intact.       Palpation of the following region(s) revealed:  Lumbar: Lumbar paraspinals bilateral, muscular hypertonicity.  Quadratus lumborum bilateral, muscular hypertonicity.  Gluteal right, hypertonicity and tenderness.  Piriformis right, hypertonicity and tenderness.  Psoas right, muscular hypertonicity.    Segmental Joint(s): Segmental joint dysfunction was assessed with motion palpation and is identified in the following  areas:  Thoracic : T4, T5, and T7  Lumbopelvic / Sacral SIJ : L4, L5/S1, R SIJ, and L SIJ    Assessment/Plan   Today's Treatment Included: Chiropractic manipulation to the Segmental Joint(s) Cervical : C5 C6 Segmental Joint(s) Lumbopelvic/Sacral SIJ : L4, L5/S1, and R SIJ Segmental Joint(s) Thoracic : T4, T5, T6, and T10   Treatment Techniques Used : Pelvic drop table technique, Pelvic blocking technique, and Low force  Active release    Soft-tissue mobilization was performed in the following areas:   Lumbar Paraspinal mm. bilateral, Quadratus Lumborum bilateral, Gluteal mm. Glute. Max.  and Glute. Med. right, Piriformis right, and Psoas right    The patient tolerated today's treatment with little or no additional discomfort and was instructed to contact the office for questions or concerns.

## 2024-05-24 ENCOUNTER — HOSPITAL ENCOUNTER (OUTPATIENT)
Dept: RADIOLOGY | Facility: HOSPITAL | Age: 54
Discharge: HOME | End: 2024-05-24
Payer: COMMERCIAL

## 2024-05-24 DIAGNOSIS — E78.2 MIXED HYPERLIPIDEMIA: ICD-10-CM

## 2024-05-24 PROCEDURE — 75571 CT HRT W/O DYE W/CA TEST: CPT

## 2024-06-02 DIAGNOSIS — E78.2 MIXED HYPERLIPIDEMIA: ICD-10-CM

## 2024-06-03 RX ORDER — ROSUVASTATIN CALCIUM 5 MG/1
5 TABLET, COATED ORAL DAILY
Qty: 30 TABLET | Refills: 5 | Status: SHIPPED | OUTPATIENT
Start: 2024-06-03 | End: 2024-11-30

## 2024-06-17 DIAGNOSIS — L74.510 HYPERHIDROSIS OF AXILLA: Primary | ICD-10-CM

## 2024-06-17 PROCEDURE — RXMED WILLOW AMBULATORY MEDICATION CHARGE

## 2024-06-18 ENCOUNTER — APPOINTMENT (OUTPATIENT)
Dept: DERMATOLOGY | Facility: CLINIC | Age: 54
End: 2024-06-18
Payer: COMMERCIAL

## 2024-06-18 PROCEDURE — RXMED WILLOW AMBULATORY MEDICATION CHARGE

## 2024-06-19 ENCOUNTER — SPECIALTY PHARMACY (OUTPATIENT)
Dept: PHARMACY | Facility: CLINIC | Age: 54
End: 2024-06-19

## 2024-06-19 ENCOUNTER — PHARMACY VISIT (OUTPATIENT)
Dept: PHARMACY | Facility: CLINIC | Age: 54
End: 2024-06-19
Payer: COMMERCIAL

## 2024-06-21 DIAGNOSIS — M25.562 ACUTE PAIN OF LEFT KNEE: Primary | ICD-10-CM

## 2024-06-21 PROCEDURE — RXMED WILLOW AMBULATORY MEDICATION CHARGE

## 2024-06-24 ENCOUNTER — HOSPITAL ENCOUNTER (OUTPATIENT)
Dept: RADIOLOGY | Facility: CLINIC | Age: 54
Discharge: HOME | End: 2024-06-24
Payer: COMMERCIAL

## 2024-06-24 ENCOUNTER — APPOINTMENT (OUTPATIENT)
Dept: ORTHOPEDIC SURGERY | Facility: CLINIC | Age: 54
End: 2024-06-24
Payer: COMMERCIAL

## 2024-06-24 VITALS — WEIGHT: 136 LBS | BODY MASS INDEX: 26.7 KG/M2 | HEIGHT: 60 IN

## 2024-06-24 DIAGNOSIS — M25.562 ACUTE PAIN OF LEFT KNEE: ICD-10-CM

## 2024-06-24 DIAGNOSIS — M22.42 CHONDROMALACIA OF LEFT PATELLA: Primary | ICD-10-CM

## 2024-06-24 PROCEDURE — 99203 OFFICE O/P NEW LOW 30 MIN: CPT | Performed by: PHYSICIAN ASSISTANT

## 2024-06-24 PROCEDURE — 73564 X-RAY EXAM KNEE 4 OR MORE: CPT | Mod: LEFT SIDE | Performed by: RADIOLOGY

## 2024-06-24 PROCEDURE — 73564 X-RAY EXAM KNEE 4 OR MORE: CPT | Mod: LT

## 2024-06-24 ASSESSMENT — PAIN SCALES - GENERAL: PAINLEVEL_OUTOF10: 5 - MODERATE PAIN

## 2024-06-24 ASSESSMENT — PAIN - FUNCTIONAL ASSESSMENT: PAIN_FUNCTIONAL_ASSESSMENT: 0-10

## 2024-06-24 ASSESSMENT — PAIN DESCRIPTION - DESCRIPTORS: DESCRIPTORS: ACHING;SORE

## 2024-06-26 ENCOUNTER — SPECIALTY PHARMACY (OUTPATIENT)
Dept: PHARMACY | Facility: CLINIC | Age: 54
End: 2024-06-26

## 2024-06-26 PROBLEM — M22.42 CHONDROMALACIA OF LEFT PATELLA: Status: ACTIVE | Noted: 2024-06-26

## 2024-06-26 NOTE — PROGRESS NOTES
"54-year-old female presents to clinic today with complaint of left knee \"popping and catching\".  She states she started noticing that approximately a week ago.  She does not recall any injury or trauma.  She mostly notices it while walking.  She does not have pain associated with this popping and catching.  She has tried a knee brace that she purchased over-the-counter which did help improve some of the symptoms however she had difficulty keeping the brace on.  .  She continues to stay active doing [place] exercises.    Patient's self reported past medical history, medications, allergies, surgical history, family and social history as well as a 10 point review of systems has been documented in the new patient intake form and scanned into the patient's electronic medical record. Pertinent findings are documented in the HPI.    Physical Examination Findings:  Constitutional: Appears well-developed and well-nourished.  Head: Normocephalic and atraumatic.  Eyes: Pupils are equal and round.  Cardiovascular: Intact distal pulses.   Respiratory: Effort normal. No respiratory distress.  Neurologic: Alert and oriented to person, place, and time.  Skin: Skin is warm and dry.  Hematologic / Lymphatic: No lymphedema, lymphangitis.  Psychiatric: normal mood and affect. Behavior is normal.   Musculoskeletal: Left knee without effusion.  No tenderness over medial or lateral joint line.  Good patella movement and tracking.  Mild grade 1 retropatellar crepitus.  No reproducible popping/catching as patient describes.  No ligamentous laxity or instability full range of motion.  No pain on exam.  Calf is supple and nontender tender.    New x-rays taken today of the left knee reveal mild degenerative changes no acute findings    Impression: Left knee patella chondromalacia    Plan: We have discussed her symptoms in detail today it is unclear of exactly what was causing the popping and catching however we discussed differential diagnosis " is including patella chondromalacia.  Given she does not have pain or instability on exam we will continue to treat this conservatively.  We discussed anti-inflammatory medication and bracing as well as therapy strengthening exercises specifically close chain quad and hamstring strengthening.  If this is something that continues gets worse or begins to be painful for her we can discuss further evaluation with possible MRI of the left knee.  She will continue to monitor symptoms and follow-up with our office as needed.    Vandana Potter PA-C  Department of Orthopaedic Surgery  [facility]    Dictation performed with the use of voice recognition software. Syntax and grammatical errors may exist.

## 2024-06-28 ENCOUNTER — PHARMACY VISIT (OUTPATIENT)
Dept: PHARMACY | Facility: CLINIC | Age: 54
End: 2024-06-28
Payer: COMMERCIAL

## 2024-07-03 ENCOUNTER — LAB (OUTPATIENT)
Dept: LAB | Facility: LAB | Age: 54
End: 2024-07-03
Payer: COMMERCIAL

## 2024-07-03 DIAGNOSIS — E78.2 MIXED HYPERLIPIDEMIA: ICD-10-CM

## 2024-07-03 LAB
ALBUMIN SERPL BCP-MCNC: 4.3 G/DL (ref 3.4–5)
ALP SERPL-CCNC: 59 U/L (ref 33–110)
ALT SERPL W P-5'-P-CCNC: 20 U/L (ref 7–45)
AST SERPL W P-5'-P-CCNC: 20 U/L (ref 9–39)
BILIRUB DIRECT SERPL-MCNC: 0.1 MG/DL (ref 0–0.3)
BILIRUB SERPL-MCNC: 0.6 MG/DL (ref 0–1.2)
CHOLEST SERPL-MCNC: 214 MG/DL (ref 0–199)
CHOLESTEROL/HDL RATIO: 2.8
HDLC SERPL-MCNC: 75.3 MG/DL
LDLC SERPL CALC-MCNC: 117 MG/DL
NON HDL CHOLESTEROL: 139 MG/DL (ref 0–149)
PROT SERPL-MCNC: 7.1 G/DL (ref 6.4–8.2)
TRIGL SERPL-MCNC: 111 MG/DL (ref 0–149)
VLDL: 22 MG/DL (ref 0–40)

## 2024-07-03 PROCEDURE — 36415 COLL VENOUS BLD VENIPUNCTURE: CPT

## 2024-07-03 PROCEDURE — 80076 HEPATIC FUNCTION PANEL: CPT

## 2024-07-03 PROCEDURE — 80061 LIPID PANEL: CPT

## 2024-07-04 PROCEDURE — RXMED WILLOW AMBULATORY MEDICATION CHARGE

## 2024-07-05 ENCOUNTER — APPOINTMENT (OUTPATIENT)
Dept: DERMATOLOGY | Facility: CLINIC | Age: 54
End: 2024-07-05
Payer: COMMERCIAL

## 2024-07-05 DIAGNOSIS — L74.510 HYPERHIDROSIS OF AXILLA: ICD-10-CM

## 2024-07-05 PROCEDURE — 64650 CHEMODENERV ECCRINE GLANDS: CPT | Performed by: STUDENT IN AN ORGANIZED HEALTH CARE EDUCATION/TRAINING PROGRAM

## 2024-07-05 ASSESSMENT — ITCH NUMERIC RATING SCALE: HOW SEVERE IS YOUR ITCHING?: 0

## 2024-07-05 ASSESSMENT — DERMATOLOGY QUALITY OF LIFE (QOL) ASSESSMENT
RATE HOW BOTHERED YOU ARE BY SYMPTOMS OF YOUR SKIN PROBLEM (EG, ITCHING, STINGING BURNING, HURTING OR SKIN IRRITATION): 0 - NEVER BOTHERED
ARE THERE EXCLUSIONS OR EXCEPTIONS FOR THE QUALITY OF LIFE ASSESSMENT: NO
RATE HOW EMOTIONALLY BOTHERED YOU ARE BY YOUR SKIN PROBLEM (FOR EXAMPLE, WORRY, EMBARRASSMENT, FRUSTRATION): 0 - NEVER BOTHERED
DATE THE QUALITY-OF-LIFE ASSESSMENT WAS COMPLETED: 67026
RATE HOW BOTHERED YOU ARE BY EFFECTS OF YOUR SKIN PROBLEMS ON YOUR ACTIVITIES (EG, GOING OUT, ACCOMPLISHING WHAT YOU WANT, WORK ACTIVITIES OR YOUR RELATIONSHIPS WITH OTHERS): 0 - NEVER BOTHERED

## 2024-07-05 ASSESSMENT — DERMATOLOGY PATIENT ASSESSMENT: DO YOU HAVE ANY NEW OR CHANGING LESIONS: NO

## 2024-07-05 ASSESSMENT — PATIENT GLOBAL ASSESSMENT (PGA): PATIENT GLOBAL ASSESSMENT: PATIENT GLOBAL ASSESSMENT:  1 - CLEAR

## 2024-07-10 ENCOUNTER — PHARMACY VISIT (OUTPATIENT)
Dept: PHARMACY | Facility: CLINIC | Age: 54
End: 2024-07-10
Payer: COMMERCIAL

## 2024-07-11 ENCOUNTER — APPOINTMENT (OUTPATIENT)
Dept: DERMATOLOGY | Facility: CLINIC | Age: 54
End: 2024-07-11
Payer: COMMERCIAL

## 2024-07-11 DIAGNOSIS — L57.8 ACTINIC SKIN DAMAGE: ICD-10-CM

## 2024-07-11 DIAGNOSIS — L82.0 INFLAMED SEBORRHEIC KERATOSIS: ICD-10-CM

## 2024-07-11 DIAGNOSIS — L81.4 LENTIGO: ICD-10-CM

## 2024-07-11 DIAGNOSIS — L82.1 SEBORRHEIC KERATOSIS: ICD-10-CM

## 2024-07-11 DIAGNOSIS — D22.9 MULTIPLE BENIGN NEVI: Primary | ICD-10-CM

## 2024-07-11 DIAGNOSIS — Z12.83 SCREENING EXAM FOR SKIN CANCER: ICD-10-CM

## 2024-07-11 PROBLEM — D22.5 MELANOCYTIC NEVI OF TRUNK: Status: RESOLVED | Noted: 2023-07-06 | Resolved: 2024-07-11

## 2024-07-11 PROBLEM — D23.9 OTHER BENIGN NEOPLASM OF SKIN, UNSPECIFIED: Status: RESOLVED | Noted: 2023-07-06 | Resolved: 2024-07-11

## 2024-07-11 PROBLEM — D18.01 HEMANGIOMA OF SKIN AND SUBCUTANEOUS TISSUE: Status: RESOLVED | Noted: 2023-07-06 | Resolved: 2024-07-11

## 2024-07-11 PROCEDURE — 1036F TOBACCO NON-USER: CPT | Performed by: DERMATOLOGY

## 2024-07-11 PROCEDURE — 99213 OFFICE O/P EST LOW 20 MIN: CPT | Performed by: DERMATOLOGY

## 2024-07-11 ASSESSMENT — DERMATOLOGY PATIENT ASSESSMENT
HAVE YOU HAD OR DO YOU HAVE VASCULAR DISEASE: NO
ARE YOU TRYING TO GET PREGNANT: NO
ARE YOU ON BIRTH CONTROL: NO
DO YOU HAVE ANY NEW OR CHANGING LESIONS: NO
HAVE YOU HAD OR DO YOU HAVE A STAPH INFECTION: NO
ARE YOU AN ORGAN TRANSPLANT RECIPIENT: NO
DO YOU USE A TANNING BED: YES, PREVIOUSLY
DO YOU USE SUNSCREEN: OCCASIONALLY
DO YOU HAVE IRREGULAR MENSTRUAL CYCLES: NO

## 2024-07-11 ASSESSMENT — DERMATOLOGY QUALITY OF LIFE (QOL) ASSESSMENT
RATE HOW BOTHERED YOU ARE BY EFFECTS OF YOUR SKIN PROBLEMS ON YOUR ACTIVITIES (EG, GOING OUT, ACCOMPLISHING WHAT YOU WANT, WORK ACTIVITIES OR YOUR RELATIONSHIPS WITH OTHERS): 0 - NEVER BOTHERED
RATE HOW BOTHERED YOU ARE BY SYMPTOMS OF YOUR SKIN PROBLEM (EG, ITCHING, STINGING BURNING, HURTING OR SKIN IRRITATION): 0 - NEVER BOTHERED
ARE THERE EXCLUSIONS OR EXCEPTIONS FOR THE QUALITY OF LIFE ASSESSMENT: NO
DATE THE QUALITY-OF-LIFE ASSESSMENT WAS COMPLETED: 67032
RATE HOW EMOTIONALLY BOTHERED YOU ARE BY YOUR SKIN PROBLEM (FOR EXAMPLE, WORRY, EMBARRASSMENT, FRUSTRATION): 0 - NEVER BOTHERED

## 2024-07-11 ASSESSMENT — ITCH NUMERIC RATING SCALE: HOW SEVERE IS YOUR ITCHING?: 0

## 2024-07-11 ASSESSMENT — PATIENT GLOBAL ASSESSMENT (PGA): PATIENT GLOBAL ASSESSMENT: PATIENT GLOBAL ASSESSMENT:  1 - CLEAR

## 2024-07-11 NOTE — PROGRESS NOTES
Subjective     Eveline Ruff is a 54 y.o. female who presents for the following: Skin Check.     Last derm visit 7/5/24 with Dr. De Souza for hyperhidrosis, no note visible at this time. She follows with him for botox injections.     Last Full Skin Exam 4/25/23 - no lesions of concern.     Review of Systems:  No other skin or systemic complaints other than what is documented elsewhere in the note.    The following portions of the chart were reviewed this encounter and updated as appropriate:  Tobacco  Allergies  Meds  Problems  Med Hx  Surg Hx         Skin Cancer History  No skin cancer on file.      Specialty Problems          Dermatology Problems    Primary focal hyperhidrosis, axilla        Objective   Well appearing patient in no apparent distress; mood and affect are within normal limits.    A full examination was performed including scalp, head, eyes, ears, nose, lips, neck, chest, axillae, abdomen, back, buttocks, bilateral upper extremities, bilateral lower extremities, hands, feet, fingers, toes, fingernails, and toenails. All findings within normal limits unless otherwise noted below.    Assessment/Plan   1. Multiple benign nevi  Brown and tan macules and papules with reassuring findings on dermoscopy    -These lesions have benign, reassuring patterns on dermoscopy  -Recommend continued self observation, and to contact the office if any changes in nevi are noticed    2. Lentigo  Tan macules    -Benign appearing on exam  -Reassurance, recommend observation    3. Seborrheic keratosis  Stuck on, waxy macule(s)/papule(s)/plaque(s) with comedo-like openings and milia like cysts    -Discussed the nature of the diagnosis  -Reassurance, recommend continued observation    4. Actinic skin damage  Background of photodamage with hyper- and hypo-pigmented macules on the skin    5. Screening exam for skin cancer  As part of a routine Full Skin Exam, a genital examination with the presence of a chaperone was  offered. The patient declined the exam and declined the chaperone.       Full body skin exam  -No lesions concerning for malignancy on the remainder the skin exam today   - The ugly duckling sign was discussed. Monitor for any skin lesions that are different in color, shape, or size than others on body  -Sun protection was discussed. Recommend SPF 30+, hats with brims, sun protective clothing, and avoiding sun exposure between 10 AM and 2 PM whenever possible  -Recommend regular skin exams or sooner if new or changing lesions       Related Procedures  Follow Up In Dermatology - Established Patient    6. Inflamed seborrheic keratosis  Left Flank  Stuck-on, waxy macule(s)/papule(s)/plaque(s) with comedo-like openings and milia-like cysts with surrounding erythema and crusting    -Patient requests cryotherapy today for these clinically inflamed lesions  -Possible side effects of liquid nitrogen treatment reviewed including formation of blisters, crusting, tenderness, scar, and discoloration which may be permanent.    Destr of lesion - Left Flank  Complexity: simple    Destruction method: cryotherapy    Informed consent: discussed and consent obtained    Lesion destroyed using liquid nitrogen: Yes    Outcome: patient tolerated procedure well with no complications          Follow up 1-2 years Full Skin Exam

## 2024-07-15 NOTE — PROGRESS NOTES
Subjective     Eveline Ruff is a 54 y.o. female who presents for the following: Excessive Sweating (Bilat. Axilla).     Review of Systems:  No other skin or systemic complaints other than what is documented elsewhere in the note.    The following portions of the chart were reviewed this encounter and updated as appropriate:          Skin Cancer History  No skin cancer on file.      Specialty Problems          Dermatology Problems    Primary focal hyperhidrosis, axilla        Objective   Well appearing patient in no apparent distress; mood and affect are within normal limits.    A focused skin examination was performed. All findings within normal limits unless otherwise noted below.    Assessment/Plan   1. Hyperhidrosis of axilla    Related Procedures  Prior Authorization for Hyperhidrosis and Botox    Related Medications  onabotulinumtoxinA (Botox) 100 unit injection  Phyisician to inject 50 units subcutaneously into each axilla    botulinum toxin Type A (Cosm) (Botox) injection 100 Units      Injected 50u in each axilla  Tolerated well

## 2024-07-30 ENCOUNTER — APPOINTMENT (OUTPATIENT)
Dept: INTEGRATIVE MEDICINE | Facility: CLINIC | Age: 54
End: 2024-07-30
Payer: COMMERCIAL

## 2024-07-30 DIAGNOSIS — M99.02 SEGMENTAL AND SOMATIC DYSFUNCTION OF THORACIC REGION: ICD-10-CM

## 2024-07-30 DIAGNOSIS — M79.10 MYALGIA: ICD-10-CM

## 2024-07-30 DIAGNOSIS — M99.03 SEGMENTAL AND SOMATIC DYSFUNCTION OF LUMBAR REGION: ICD-10-CM

## 2024-07-30 DIAGNOSIS — M99.04 SEGMENTAL AND SOMATIC DYSFUNCTION OF SACRAL REGION: ICD-10-CM

## 2024-07-30 DIAGNOSIS — M99.01 SEGMENTAL AND SOMATIC DYSFUNCTION OF CERVICAL REGION: Primary | ICD-10-CM

## 2024-07-30 DIAGNOSIS — M79.9 POSTURAL STRAIN: ICD-10-CM

## 2024-07-30 DIAGNOSIS — M54.59 MECHANICAL LOW BACK PAIN: ICD-10-CM

## 2024-07-30 DIAGNOSIS — M54.2 CERVICALGIA: ICD-10-CM

## 2024-07-30 PROCEDURE — 97112 NEUROMUSCULAR REEDUCATION: CPT | Performed by: CHIROPRACTOR

## 2024-07-30 PROCEDURE — 98941 CHIROPRACT MANJ 3-4 REGIONS: CPT | Performed by: CHIROPRACTOR

## 2024-07-30 NOTE — PROGRESS NOTES
Subjective   Patient ID: Eveline Ruff is a 54 y.o. female who presents July 30, 2024 for neck pain and low back pain.    (4/20) VPCY    Today, the patient rates their degree of pain as a 3 out of 10 on the numeric pain rating scale.     Eveline returns for continued chiropractic care. She reports that she continues to do well. She states that her shoulder continues to be her main complaint. She denies any increased discomfort in the hip today. The patient denies any changes in health since her last encounter and will follow up as scheduled.   ________________________________________________________________  INITIAL HISTORY (07/02/2020):  Neck pain - 2 month worsening of constant 5/10 NRS Neck pain, radiates into BL entire UEs with tingling mostly at night, or occasionally during day. No trauma, numbness. SLeeps with arms/hands up near pillow. Tingling in arms/hands is chronic, occurred at least 2+ years. Saw Ortho, was Dx with CTS via electrodiagnostics, and had injections which reportedly helped. Notes neck pain is new addition to the numbness symptoms. Relates being working from home due to pandemic, possibly not having as comfortable of a chair.       Objective   Physical Exam  Neurological:      General: No focal deficit present.      Mental Status: She is alert and oriented to person, place, and time.      Cranial Nerves: No dysarthria or facial asymmetry.      Sensory: Sensation is intact.      Motor: Motor function is intact.      Coordination: Coordination is intact.      Gait: Gait is intact.       Palpation of the following region(s) revealed:  Lumbar: Lumbar paraspinals bilateral, muscular hypertonicity.  Quadratus lumborum bilateral, muscular hypertonicity.  Gluteal right, hypertonicity and tenderness.  Piriformis right, hypertonicity and tenderness.  Psoas right, muscular hypertonicity.    Segmental Joint(s): Segmental joint dysfunction was assessed with motion palpation and is identified in the  following areas:  Thoracic : T4, T5, and T7  Lumbopelvic / Sacral SIJ : L4, L5/S1, R SIJ, and L SIJ    Assessment/Plan   Today's Treatment Included: Chiropractic manipulation to the Segmental Joint(s) Cervical : C5 C6 Segmental Joint(s) Lumbopelvic/Sacral SIJ : L4, L5/S1, and R SIJ Segmental Joint(s) Thoracic : T4, T5, T6, and T10   Treatment Techniques Used : Pelvic drop table technique, Pelvic blocking technique, and Low force  Neuromuscular Re-Education (90118): Start time: 4:00 pm End time: 4:30 pm  2 Units  Active release    Soft-tissue mobilization was performed in the following areas:   Lumbar Paraspinal mm. bilateral, Quadratus Lumborum bilateral, Gluteal mm. Glute. Max.  and Glute. Med. right, Piriformis right, and Psoas right    The patient tolerated today's treatment with little or no additional discomfort and was instructed to contact the office for questions or concerns.

## 2024-07-31 ENCOUNTER — APPOINTMENT (OUTPATIENT)
Dept: OTOLARYNGOLOGY | Facility: CLINIC | Age: 54
End: 2024-07-31
Payer: COMMERCIAL

## 2024-07-31 VITALS — WEIGHT: 133.5 LBS | BODY MASS INDEX: 26.21 KG/M2 | TEMPERATURE: 97.9 F | HEIGHT: 60 IN

## 2024-07-31 DIAGNOSIS — R05.3 CHRONIC COUGHING: Primary | ICD-10-CM

## 2024-07-31 DIAGNOSIS — J35.1 LINGUAL TONSIL HYPERTROPHY: ICD-10-CM

## 2024-07-31 DIAGNOSIS — K21.9 LARYNGOPHARYNGEAL REFLUX (LPR): ICD-10-CM

## 2024-07-31 PROCEDURE — 3008F BODY MASS INDEX DOCD: CPT | Performed by: NURSE PRACTITIONER

## 2024-07-31 PROCEDURE — 1036F TOBACCO NON-USER: CPT | Performed by: NURSE PRACTITIONER

## 2024-07-31 PROCEDURE — 31505 DIAGNOSTIC LARYNGOSCOPY: CPT | Performed by: NURSE PRACTITIONER

## 2024-07-31 PROCEDURE — 99204 OFFICE O/P NEW MOD 45 MIN: CPT | Performed by: NURSE PRACTITIONER

## 2024-07-31 ASSESSMENT — PATIENT HEALTH QUESTIONNAIRE - PHQ9
2. FEELING DOWN, DEPRESSED OR HOPELESS: NOT AT ALL
1. LITTLE INTEREST OR PLEASURE IN DOING THINGS: NOT AT ALL
SUM OF ALL RESPONSES TO PHQ9 QUESTIONS 1 AND 2: 0

## 2024-07-31 NOTE — PROGRESS NOTES
Subjective   Patient ID: Eveline Ruff is a 54 y.o. female who presents for Chronic cough.  HPI    Patient is here today for chronic cough. Patient was previously evaluated for this by Dr. Cordova and per exam and notes she has reflux related cough and was recommended PPI, diet and lifestyle modifications. Plan was to taper of  the PPI and if symptoms persists then  referral to Gastroenterology.     Patient  states that she still has Persistent cough   At the Beginning of the year, she switched cholesterol meds and that's when cough started again.  Sometimes drinking water makes her cough more.  She tries using cough drops which sometime helps.     No difficulty eating or swallowing  No trismus  No unintended weight loss    She does have food triggers  I.e tomato based sauces.       Review of Systems    All other systems have been reviewed and are negative for complaints except for those mentioned in history of present illness, past medical history and problem list       Objective   Physical Exam    CONSTITUTIONAL: No acute distress, normal facial features; No fever; no chills  VOICE: No hoarseness or other audible abnormality  RESPIRATION: Breathing comfortably, no stridor; normal breathing effort  CV: No cyanosis visible on the face and neck area  EYES:Pupils equal and round ; no erythema; conjunctiva clear; sclera white  NEURO: Alert and oriented, able to raise eyebrows symmetrical bilateral, smile with no facial droop, able to swallow  HEAD AND FACE: Symmetric facial features, no masses or lesions    Right ear examination: External ear normal. EAC clear. TM intact without effusion, retraction or perforation.   Left ear examination: External ear normal. EAC clear. TM intact without effusion, retraction or perforation.    NOSE: External nose midline; Inferiro nasal turbinates normal no mucopus or polyps.  ORAL CAVITY: No lesions of external lips; uvula is midline; tongue with good mobility; no gross mass in oral  cavity; mucosa appears pink   NECK/LYMPH: No obvious deformity or lesions; trachea is midline  PSYCH: Alert and oriented with appropriate mood and affect.    Patient ID: Eveline Ruff is a 54 y.o. female.    Procedures    FOL  PROCEDURE NOTE:  Recommended flexible nasopharyngoscopy.  Risks, benefits, personnel and alternatives were explained.  The patient wished to proceed.  S/he was re-identified.  PROCEDURE:  Flexible nasopharyngoscopy  DIAGNOSIS: Chronic cough ; LPR    INDICATIONS: Inability to tolerate mirror exam  PROCEDURE NOTE:  Recommended flexible nasopharyngoscopy.  Risks, benefits, personnel and alternatives were explained.  The patient wished to proceed.  Patient was re-identified.  FINDINGS:  The nasopharynx and oropharynx were normal without any lesions or masses visualized.  No masses or lesions were visualized at , vallecula, epiglottis, aryepiglottic folds, pyriform sinuses, and lateral pharyngeal walls. Noted prominent lingual tonsils  and posterior pharyngeal wall cobblestoning. There is evidence of  interarytenoid thickening ; pooling of secretions in pirifom sinuses noted. True vocal fold movement was normal no nodules or lesions.  The patient's airway was widely patent with no evidence of obstruction.  Patient tolerated the procedure well, and there were no complications.     Assessment/Plan       1. Chronic coughing  Referral to Speech Therapy    Referral to Gastroenterology      2. Laryngopharyngeal reflux (LPR)  Referral to Speech Therapy    Referral to Gastroenterology      3. Lingual tonsil hypertrophy          Her clinical exam today is consistent with LPR. I recommend diet and lifestyle modification favorable to reducing acid reflux.  I referred to Gastroenterology for pH manometry and for further medical management of reflux. Patient has tried PPI and H2 blockers.  She may return  to ENT after the GI workup is done.   I also referred to speech therapy for  cough reduction techniques.             GEMA Chaparro-DIO 07/31/24 11:21 AM

## 2024-07-31 NOTE — PATIENT INSTRUCTIONS
"LARYNGOPHARYNGEAL REFLUX (LPR) OR SILENT REFLUX  A common cause of hoarseness or voice changes is gastroesophageal reflux disease (GERD). GERD occurs when stomach acid flows back up into the esophagus (swallowing tube). When the acid reaches the throat, it is called laryngopharyngeal reflux (LPR) or silent reflux. It is called \"silent\" because many people with LPR have no heartburn or stomach upset.    Possible Signs and Symptoms:  Hoarseness, cough  Sensation of lump in the throat, Vocal fold swelling and irritation  Frequent throat clearing, Vocal fold lesions or ulcerations  Mucous sticking in the throat, Worsening of asthma  Low grade sore throat, Worsening of sinus drainage or post nasal drip  Lifestyle changes  · If you are overweight, talk with your health care provider about losing weight.   · If you smoke, quit! Your health care provider may have ideas to help you quit.   Dietary guidelines  · Eat smaller, more frequent meals rather than large one.   · Avoid food or liquids for 2-3 hours before lying down (no bedtime snacks!)   · Avoid or limit the following:   Caffeinated products: coffee, tea, sodas, chocolate  Red sauces and salsa  Fatty, fried, or greasy foods  Citric juices: orange, grapefruit  Spicy foods  Mints: peppermint, spearmint  Alcohol    Physical measures  · Elevate the head of the bed 6 inches by placing blocks or thick books under the legs of the head of the bed. Using extra pillows is NOT a good alternative.   · Avoid eating ANYTHING for 3-4 hours before bedtime.   · Avoid bending forward at the waist.   · Avoid wearing tight fitting clothing.   Antireflux medications if recommended or prescribed need to be taken on an empty stomach, 30 minutes before meals. These medications act by preventing acid production, not by neutralizing acid already present in the stomach.    Changes in voice and hoarseness can be the result of a number of different causes. One of the more common causes is " gastroesophageal reflux disease (GERD). GERD occurs when stomach acid regurgitates back up in the esophagus (swallowing tube). When the acid reaches the throat we refer to this as laryngopharyngeal reflux (LPR). This reflux can cause inflammation and swelling in the throat or in the larynx (voice box) and can result in a number of different symptoms. These include mild hoarseness which is typically worse in the morning, a sense of a foreign body or lump in the throat, a sense of mucous sticking, a need to frequently clear the throat. Oftentimes, LPR irritation in the throat and larynx is mistaken for sinus drainage because of the sensation of mucous and post nasal drip.    LPR tends to become more prominent as people age, is often related to the timing and type of the diet, and may be the source of chronic symptoms. LPR reflux may also be the cause of low grade sorethroat and chronic cough, particularly cough that wakes people up in the middle of the night.    Approximately 50% of people with significant LPR have no symptoms of heartburn or stomach upset.    The diagnosis of LPR as a cause of throat symptoms is usually based on classic symptoms and physical findings of inflammation in the throat in locations consistent with LPR (back part of the voicebox). Often the patient is treated for LPR without any further testing. More significant testing is usually not needed. However, on some occasion, some additional tests may be required such as a swallowing study with barium, an endoscopic evaluation of the esophagus and stomach, or a probe that measures acidity (pH) in the throat and esophagus.    The most important treatment of LPR is dietary control. A somewhat bland diet, smaller but more frequent meals, avoidance of alcohol, tobacco and caffeine, and not eating within 3-4 hours of bedtime are the most important factors. Avoidance of acidic and fatty foods and mint are also important. Elevation of the head of the bed  and avoidance of tight, binding clothing is of value. A person with reflux who is overweight should reduce weight, and reducing stress also frequently improves the symptoms.  Regular use of a recommended or prescribed medication for at least 2 months while observing dietary control is critical. Humidification, hydration, mucous thinners and avoidance of throat clearing are all of value for those people who have significant throat symptoms from gastroesophageal reflux.  Untreated LPR can lead to chronic swelling of the vocal folds, ulcerations of the vocal folds and formation of masses known as granulomas. LPR can also make asthma worse.

## 2024-08-16 ENCOUNTER — EVALUATION (OUTPATIENT)
Dept: SPEECH THERAPY | Facility: CLINIC | Age: 54
End: 2024-08-16
Payer: COMMERCIAL

## 2024-08-16 DIAGNOSIS — J38.3 VOCAL CORDS SWELLING: Primary | ICD-10-CM

## 2024-08-16 DIAGNOSIS — R05.3 CHRONIC COUGHING: ICD-10-CM

## 2024-08-16 DIAGNOSIS — J38.7 IRRITABLE LARYNX: ICD-10-CM

## 2024-08-16 DIAGNOSIS — R09.89 CHRONIC THROAT CLEARING: ICD-10-CM

## 2024-08-16 DIAGNOSIS — K21.9 LARYNGOPHARYNGEAL REFLUX (LPR): ICD-10-CM

## 2024-08-16 DIAGNOSIS — R05.3 CHRONIC COUGH: ICD-10-CM

## 2024-08-16 PROCEDURE — 92524 BEHAVRAL QUALIT ANALYS VOICE: CPT | Mod: GN

## 2024-08-16 ASSESSMENT — PAIN - FUNCTIONAL ASSESSMENT: PAIN_FUNCTIONAL_ASSESSMENT: 0-10

## 2024-08-16 ASSESSMENT — PAIN SCALES - GENERAL: PAINLEVEL_OUTOF10: 0 - NO PAIN

## 2024-08-16 NOTE — PROGRESS NOTES
Speech-Language Pathology    Voice Evaluation    Patient Name: Eveline Ruff  MRN: [id number]  Today's Date: 8/16/2024     Time Calculation  Start Time: 0945  Stop Time: 1025  Time Calculation (min): 40 min      Current Problem:  Patient Active Problem List   Diagnosis    Astigmatism, bilateral    TMJ (sprain of temporomandibular joint)    Segmental and somatic dysfunction    Retinal pigment epithelium abnormality    Neurogenic thoracic outlet syndrome    Bilateral myopia    Laryngopharyngeal reflux (LPR)    Hypertension    Hyperlipidemia    Hematuria    Esophageal dysphagia    Episodic mydriasis of right eye    Congenital hypertrophy of retinal pigment epithelium    Cervicothoracic somatic dysfunction    Carpal tunnel syndrome, bilateral upper limbs    Primary focal hyperhidrosis, axilla    Routine general medical examination at a health care facility    Chondromalacia of left patella    Vocal cords swelling    Irritable larynx    Chronic cough    Chronic throat clearing   SUBJECTIVE  Patient alert and oriented and ready to participate in office visit this date.    IMPRESSIONS  Patient presents with irritable larynx syndrome 2/2 a diagnosis of chronic cough and throat clearing with bilateral vocal cord edema. Patient appears to be an excellent candidate for therapy which will target vocal wellness and cough suppression therapy. Patient may be a candidate for a SLN injection pending outcome of GI workup and cough behavior modification therapy.    Voice quality based on the GRBAS scale: 0=absent; 1=mild; 2=moderate; 3=severe    stGstrstastdstest:st st1st Roughness: 0    Breathiness: 0    Asthenia: 0    Strain: 0    CONTRIBUTING FACTORS  ILS  Habitual behaviors that misuse/abuse the voice  Exposure to biological irritants (GERD/LPR) (scheduled to see GI in 1 month)  Xerostomia     HISTORY/REASON FOR REFERRAL  Eveline Ruff is a 54 y.o. female referred to me by Bryson Li CNP for chronic cough. The patient states the cough  "first started with Lisinopril. Once taken off the cough improved. However, she was recently started on a new medication for cholesterol and the cough returned. She plans to discuss with her PCP.     Patient was previously evaluated for this by Dr. Cordova in 2022 and per exam and notes she was diagnosed with reflux related cough and was recommended PPI, diet and lifestyle modifications. She stopped taking a PPI for a short period but put herself back on OTC Prilosec. Cough has persisted. She is scheduled to see GI in 1 month.     She is unsure of her cough triggers. She states she usually feels a tickle in her throat prior to the cough. She can sometimes avoid the cough with sips of water or a sugar free Luden's cough lozenge. She does admit to mouth dryness associated with her medications. She does drink water, possibly not enough.     No snoring per patient report. However, admits to poor sleep.     ASSESSMENT  Pain Assessment:  Pain Assessment: 0-10  0-10 (Numeric) Pain Score: 0 - No pain    Cough Severity Index:  My cough is worse when I lie down.: 1  My coughing problem causes me to restrict my personal and social life.: 0  I tend to avoid places because of my coughing problem.: 0  I feel embarassed because of my coughing problem.: 0  People ask \"What's wrong\" because I cough a lot.: 1  I run out of air when I cough.: 0  My coughing problem affects my voice.: 2  My coughing problem limits my physical activity.: 0  My coughing problem upsets me.: 2  People ask me if I am sick because I cough a lot.: 0  CSI Total Score: 6    Reflux Symptom Index:  Hoarseness or a problem with your voice.: 1  Clearing your throat.: 3  Excess throat or mucous postnasal drip.: 2  Difficulty swallowing food, liquids or pills.: 2  Coughing after eating or after lying down.: 4  Breathing difficulties or choking episodes.: 0  Troublesome or annoying cough.: 5  Sensations of something sticking in your throat.: 3  Heartburn, chest pain, " "indigestion, or stomach acid coming up.: 0  Reflux Symptom Index Total Score: 20    PERCEPTUAL VOICE FEATURES  Intonation: WFL  Loudness: WFL  Nasal resonance: WFL    Additional vocal characteristics noted included:  Throat clears/coughing  Globus complaints    FLEXIBLE NASOPHARYNGOSCOPY ([facility], 7/31/24)   \"FINDINGS:  The nasopharynx and oropharynx were normal without any lesions or masses visualized.  No masses or lesions were visualized at , vallecula, epiglottis, aryepiglottic folds, pyriform sinuses, and lateral pharyngeal walls. Noted prominent lingual tonsils  and posterior pharyngeal wall cobblestoning. There is evidence of interarytenoid thickening; pooling of secretions in pirifom sinuses noted. True vocal fold movement was normal no nodules or lesions. The patient's airway was widely patent with no evidence of obstruction. Patient tolerated the procedure well, and there were no complications.\"     TREATMENT  Instructed patient this date in:  cough/throat clear reduction techniques (effortful swallow, RTB, Alice, silent cough/clear)  seltzer or warm water gargle technique to safely remove mucus  vocal wellness strategies to reduce cough/throat clear triggers and phono trauma  reflux lifestyle modifications    Clinician modeled all techniques and accurate patient follow through confirmed.  Handouts provided to facilitate optimal home carryover.    PLAN OF CARE  Frequency: 1-2x/month  Duration:  1-2 months    LONG TERM GOALS  Improve overall vocal health to foster increased participation levels at home, work and in the community environment.  Patient will demonstrate reduction in symptoms as evidenced by improved scores on VHI-10, RSI, and/or CSI following treatment.    SHORT TERM GOALS  Patient will independently execute strategies for cough reduction 80-90% of the time, per therapeutic observation and patient self-report.  Patient will increase understanding of sensory motor response patterns, in order to " facilitate implementation of cough reduction strategies in 8 out of 10 opportunities.   Patient will decrease laryngeal hyperresponsiveness via behavioral strategies in 8 out of 10 opportunities, per therapeutic observation and patient self-report.     RECOMMENDATIONS FOR THERAPEUTIC INTERVENTIONS  Vocal hygiene program    POTENTIAL FOR IMPROVEMENT  Good    FACTORS AFFECTING PROGNOSIS  None    DISCUSSED PLAN OF CARE WITH patient  DISCUSSED RISK/BENEFITS WITH patient  PATIENT/CAREGIVER AGREEABLE WITH PLAN.

## 2024-09-03 DIAGNOSIS — Z12.31 BREAST CANCER SCREENING BY MAMMOGRAM: Primary | ICD-10-CM

## 2024-09-10 ENCOUNTER — APPOINTMENT (OUTPATIENT)
Dept: INTEGRATIVE MEDICINE | Facility: CLINIC | Age: 54
End: 2024-09-10
Payer: COMMERCIAL

## 2024-09-10 DIAGNOSIS — M99.04 SEGMENTAL AND SOMATIC DYSFUNCTION OF SACRAL REGION: ICD-10-CM

## 2024-09-10 DIAGNOSIS — M99.03 SEGMENTAL AND SOMATIC DYSFUNCTION OF LUMBAR REGION: ICD-10-CM

## 2024-09-10 DIAGNOSIS — M54.59 MECHANICAL LOW BACK PAIN: ICD-10-CM

## 2024-09-10 DIAGNOSIS — M54.2 CERVICALGIA: ICD-10-CM

## 2024-09-10 DIAGNOSIS — M99.01 SEGMENTAL AND SOMATIC DYSFUNCTION OF CERVICAL REGION: Primary | ICD-10-CM

## 2024-09-10 DIAGNOSIS — M99.02 SEGMENTAL AND SOMATIC DYSFUNCTION OF THORACIC REGION: ICD-10-CM

## 2024-09-10 DIAGNOSIS — M79.10 MYALGIA: ICD-10-CM

## 2024-09-10 DIAGNOSIS — M79.9 POSTURAL STRAIN: ICD-10-CM

## 2024-09-10 PROCEDURE — 98941 CHIROPRACT MANJ 3-4 REGIONS: CPT | Performed by: CHIROPRACTOR

## 2024-09-10 PROCEDURE — RXMED WILLOW AMBULATORY MEDICATION CHARGE

## 2024-09-10 NOTE — PROGRESS NOTES
Subjective   Patient ID: Eveline Ruff is a 54 y.o. female who presents September 10, 2024 for neck pain and low back pain.    (5/20) VPCY    Today, the patient rates their degree of pain as a 3 out of 10 on the numeric pain rating scale.     Eveline returns for continued chiropractic care. She reports that she continues to do well. She states that she has been intermittently experiencing mid back pain when stretching. The patient denies any changes in health since her last encounter and will follow up as scheduled.   ________________________________________________________________  INITIAL HISTORY (07/02/2020):  Neck pain - 2 month worsening of constant 5/10 NRS Neck pain, radiates into BL entire UEs with tingling mostly at night, or occasionally during day. No trauma, numbness. SLeeps with arms/hands up near pillow. Tingling in arms/hands is chronic, occurred at least 2+ years. Saw Ortho, was Dx with CTS via electrodiagnostics, and had injections which reportedly helped. Notes neck pain is new addition to the numbness symptoms. Relates being working from home due to pandemic, possibly not having as comfortable of a chair.       Objective   Physical Exam  Neurological:      General: No focal deficit present.      Mental Status: She is alert and oriented to person, place, and time.      Cranial Nerves: No dysarthria or facial asymmetry.      Sensory: Sensation is intact.      Motor: Motor function is intact.      Coordination: Coordination is intact.      Gait: Gait is intact.       Palpation of the following region(s) revealed:  Lumbar: Lumbar paraspinals bilateral, muscular hypertonicity.  Quadratus lumborum bilateral, muscular hypertonicity.  Gluteal right, hypertonicity and tenderness.  Piriformis right, hypertonicity and tenderness.  Psoas right, muscular hypertonicity.    Segmental Joint(s): Segmental joint dysfunction was assessed with motion palpation and is identified in the following areas:  Thoracic : T4,  T5, and T7  Lumbopelvic / Sacral SIJ : L4, L5/S1, R SIJ, and L SIJ    Assessment/Plan   Today's Treatment Included: Chiropractic manipulation to the Segmental Joint(s) Cervical : C5 C6 Segmental Joint(s) Lumbopelvic/Sacral SIJ : L4, L5/S1, and R SIJ Segmental Joint(s) Thoracic : T4, T5, T6, and T10   Treatment Techniques Used : Pelvic drop table technique, Pelvic blocking technique, and Low force  Active release    Soft-tissue mobilization was performed in the following areas:   Lumbar Paraspinal mm. bilateral, Quadratus Lumborum bilateral, Gluteal mm. Glute. Max.  and Glute. Med. right, Piriformis right, and Psoas right    The patient tolerated today's treatment with little or no additional discomfort and was instructed to contact the office for questions or concerns.

## 2024-09-13 ENCOUNTER — PHARMACY VISIT (OUTPATIENT)
Dept: PHARMACY | Facility: CLINIC | Age: 54
End: 2024-09-13
Payer: COMMERCIAL

## 2024-09-18 ENCOUNTER — SPECIALTY PHARMACY (OUTPATIENT)
Dept: PHARMACY | Facility: CLINIC | Age: 54
End: 2024-09-18

## 2024-09-20 ENCOUNTER — APPOINTMENT (OUTPATIENT)
Dept: GASTROENTEROLOGY | Facility: CLINIC | Age: 54
End: 2024-09-20
Payer: COMMERCIAL

## 2024-10-02 PROCEDURE — RXMED WILLOW AMBULATORY MEDICATION CHARGE

## 2024-10-03 ENCOUNTER — PHARMACY VISIT (OUTPATIENT)
Dept: PHARMACY | Facility: CLINIC | Age: 54
End: 2024-10-03
Payer: COMMERCIAL

## 2024-10-03 PROCEDURE — RXMED WILLOW AMBULATORY MEDICATION CHARGE

## 2024-10-04 ENCOUNTER — PHARMACY VISIT (OUTPATIENT)
Dept: PHARMACY | Facility: CLINIC | Age: 54
End: 2024-10-04
Payer: COMMERCIAL

## 2024-11-08 ENCOUNTER — APPOINTMENT (OUTPATIENT)
Dept: GASTROENTEROLOGY | Facility: CLINIC | Age: 54
End: 2024-11-08
Payer: COMMERCIAL

## 2024-11-08 VITALS — HEIGHT: 60 IN | BODY MASS INDEX: 26.7 KG/M2 | WEIGHT: 136 LBS | HEART RATE: 88 BPM | OXYGEN SATURATION: 98 %

## 2024-11-08 DIAGNOSIS — K21.9 LARYNGOPHARYNGEAL REFLUX (LPR): ICD-10-CM

## 2024-11-08 DIAGNOSIS — Z12.11 COLON CANCER SCREENING: Primary | ICD-10-CM

## 2024-11-08 DIAGNOSIS — R05.3 CHRONIC COUGH: ICD-10-CM

## 2024-11-08 DIAGNOSIS — R05.3 CHRONIC COUGHING: ICD-10-CM

## 2024-11-08 PROCEDURE — 3008F BODY MASS INDEX DOCD: CPT

## 2024-11-08 PROCEDURE — 99203 OFFICE O/P NEW LOW 30 MIN: CPT

## 2024-11-08 ASSESSMENT — ENCOUNTER SYMPTOMS
DIARRHEA: 0
BLOOD IN STOOL: 0
ANAL BLEEDING: 0
ABDOMINAL DISTENTION: 0
COUGH: 1
FATIGUE: 0
RECTAL PAIN: 0
FEVER: 0
CHILLS: 0
CONSTIPATION: 0
TROUBLE SWALLOWING: 0
APPETITE CHANGE: 0
NAUSEA: 0
VOMITING: 0
ABDOMINAL PAIN: 0
SHORTNESS OF BREATH: 0

## 2024-11-08 NOTE — PROGRESS NOTES
Subjective     History of Present Illness:   Eveline Ruff is a 54 y.o. female with PMHx HTN who presents to GI clinic for further evaluation of chronic cough    Today, patient has had intermittent chronic cough for years.  Most recently in July lasting for a few weeks. Has taken gaviscon prn and it improves symptoms.  Denies any other GI complaints.  Patient also due for colon cancer screening and prefers cologuard.  Denies constipation, diarrhea, dyspepsia, melena, hematochezia, dysphagia, unintentional weight loss    Social ETOH, denies smoking or marijuana  Denies fxh GI cancer or IBD  Abdominal Surgeries: denies    9/2021 negative cologuard  Denies colonoscopy   Denies EGD       Past Medical History  As per HPI.     Social History  she  reports that she has never smoked. She has never used smokeless tobacco. She reports current alcohol use of about 1.0 standard drink of alcohol per week. She reports that she does not use drugs.     Family History  her family history includes Hyperlipidemia in her mother; Hypertension in her father; Pancreatic cancer in her father; Rheum arthritis in her mother; Skin cancer in her father; Urinary Bladder Cancer in her mother.     Review of Systems  Review of Systems   Constitutional:  Negative for appetite change, chills, fatigue and fever.   HENT:  Negative for trouble swallowing.    Respiratory:  Positive for cough (intermittent). Negative for shortness of breath.    Gastrointestinal:  Negative for abdominal distention, abdominal pain, anal bleeding, blood in stool, constipation, diarrhea, nausea, rectal pain and vomiting.       Allergies  No Known Allergies    Medications  Current Outpatient Medications   Medication Instructions    buPROPion XL (WELLBUTRIN XL) 300 mg, oral, Daily, Do not crush, chew, or split.    levonorgestrel (Mirena) 21 mcg/24 hours (8 yrs) 52 mg IUD 1 each, intrauterine, Once    losartan (Cozaar) 50 mg tablet TAKE 1 TABLET BY MOUTH ONCE DAILY     onabotulinumtoxinA (Botox) 100 unit injection Phyisician to inject 50 units subcutaneously into each axilla    rosuvastatin (CRESTOR) 5 mg, oral, Daily        Objective   Visit Vitals  Pulse 88      Physical Exam  Constitutional:       Appearance: Normal appearance. She is normal weight.   HENT:      Mouth/Throat:      Mouth: Mucous membranes are dry.      Pharynx: Oropharynx is clear.   Cardiovascular:      Rate and Rhythm: Normal rate and regular rhythm.   Pulmonary:      Effort: Pulmonary effort is normal.      Breath sounds: Normal breath sounds. No wheezing or rhonchi.   Abdominal:      General: Abdomen is flat. Bowel sounds are normal. There is no distension.      Palpations: Abdomen is soft. There is no hepatomegaly.      Tenderness: There is no abdominal tenderness. There is no guarding or rebound. Negative signs include Turner's sign.      Hernia: No hernia is present.   Musculoskeletal:         General: Normal range of motion.   Skin:     General: Skin is warm and dry.   Neurological:      General: No focal deficit present.      Mental Status: She is alert and oriented to person, place, and time.   Psychiatric:         Mood and Affect: Mood normal.         Behavior: Behavior normal.           Lab Results   Component Value Date    WBC 7.2 12/22/2023    WBC 5.8 02/17/2023    WBC 6.9 01/29/2022    HGB 14.2 12/22/2023    HGB 14.0 02/17/2023    HGB 13.7 01/29/2022    HCT 43.0 12/22/2023    HCT 42.5 02/17/2023    HCT 43.0 01/29/2022     12/22/2023     02/17/2023     01/29/2022     Lab Results   Component Value Date     12/22/2023     05/19/2023     02/17/2023    K 4.4 12/22/2023    K 4.2 05/19/2023    K 4.5 02/17/2023     12/22/2023     05/19/2023     02/17/2023    CO2 29 12/22/2023    CO2 29 05/19/2023    CO2 27 02/17/2023    BUN 9 12/22/2023    BUN 12 05/19/2023    BUN 10 02/17/2023    CREATININE 0.95 12/22/2023    CREATININE 0.84 05/19/2023     CREATININE 0.89 02/17/2023    CALCIUM 9.6 12/22/2023    CALCIUM 10.1 05/19/2023    CALCIUM 9.6 02/17/2023    PROT 7.1 07/03/2024    PROT 7.2 12/22/2023    PROT 7.2 02/17/2023    BILITOT 0.6 07/03/2024    BILITOT 0.7 12/22/2023    BILITOT 0.7 02/17/2023    ALKPHOS 59 07/03/2024    ALKPHOS 64 12/22/2023    ALKPHOS 53 02/17/2023    ALT 20 07/03/2024    ALT 49 (H) 12/22/2023    ALT 22 02/17/2023    AST 20 07/03/2024    AST 28 12/22/2023    AST 17 02/17/2023    GLUCOSE 85 12/22/2023    GLUCOSE 81 05/19/2023    GLUCOSE 91 02/17/2023           Eveline YOUNGBLOOD Speedy is a 54 y.o. female who presents to GI clinic for chronic cough.    Chronic cough  Likely due to intermittent GERD  - continue prn gaviscon  - consider EGD if persistent    Follow up as needed    Colon cancer screening  - cologuard ordered per patient preference         Tiffanie Velasco, APRN-CNP

## 2024-11-08 NOTE — PATIENT INSTRUCTIONS
I recommend taking as needed Gaviscon when your coughing starts.    I ordered you a cologuard test.  If positive, I recommend we do a colonoscopy    Follow up as needed

## 2024-11-21 LAB — NONINV COLON CA DNA+OCC BLD SCRN STL QL: NEGATIVE

## 2024-12-05 ENCOUNTER — APPOINTMENT (OUTPATIENT)
Dept: INTEGRATIVE MEDICINE | Facility: CLINIC | Age: 54
End: 2024-12-05
Payer: COMMERCIAL

## 2024-12-09 PROCEDURE — RXMED WILLOW AMBULATORY MEDICATION CHARGE

## 2024-12-12 ENCOUNTER — PHARMACY VISIT (OUTPATIENT)
Dept: PHARMACY | Facility: CLINIC | Age: 54
End: 2024-12-12
Payer: COMMERCIAL

## 2024-12-20 ENCOUNTER — HOSPITAL ENCOUNTER (OUTPATIENT)
Dept: RADIOLOGY | Facility: CLINIC | Age: 54
Discharge: HOME | End: 2024-12-20
Payer: COMMERCIAL

## 2024-12-20 VITALS — WEIGHT: 138 LBS | HEIGHT: 60 IN | BODY MASS INDEX: 27.09 KG/M2

## 2024-12-20 DIAGNOSIS — Z12.31 BREAST CANCER SCREENING BY MAMMOGRAM: ICD-10-CM

## 2024-12-20 PROCEDURE — 77063 BREAST TOMOSYNTHESIS BI: CPT

## 2024-12-23 ENCOUNTER — APPOINTMENT (OUTPATIENT)
Dept: OBSTETRICS AND GYNECOLOGY | Facility: CLINIC | Age: 54
End: 2024-12-23
Payer: COMMERCIAL

## 2024-12-23 VITALS
SYSTOLIC BLOOD PRESSURE: 122 MMHG | DIASTOLIC BLOOD PRESSURE: 80 MMHG | BODY MASS INDEX: 26.9 KG/M2 | WEIGHT: 137 LBS | HEIGHT: 60 IN

## 2024-12-23 DIAGNOSIS — Z00.00 ANNUAL PHYSICAL EXAM: ICD-10-CM

## 2024-12-23 DIAGNOSIS — Z30.431 ENCOUNTER FOR ROUTINE CHECKING OF INTRAUTERINE CONTRACEPTIVE DEVICE (IUD): ICD-10-CM

## 2024-12-23 DIAGNOSIS — Z12.4 CERVICAL CANCER SCREENING: ICD-10-CM

## 2024-12-23 DIAGNOSIS — Z01.419 WELL WOMAN EXAM: Primary | ICD-10-CM

## 2024-12-23 PROCEDURE — 3079F DIAST BP 80-89 MM HG: CPT | Performed by: OBSTETRICS & GYNECOLOGY

## 2024-12-23 PROCEDURE — 3074F SYST BP LT 130 MM HG: CPT | Performed by: OBSTETRICS & GYNECOLOGY

## 2024-12-23 PROCEDURE — 87624 HPV HI-RISK TYP POOLED RSLT: CPT

## 2024-12-23 PROCEDURE — 88175 CYTOPATH C/V AUTO FLUID REDO: CPT

## 2024-12-23 PROCEDURE — 3008F BODY MASS INDEX DOCD: CPT | Performed by: OBSTETRICS & GYNECOLOGY

## 2024-12-23 PROCEDURE — 1036F TOBACCO NON-USER: CPT | Performed by: OBSTETRICS & GYNECOLOGY

## 2024-12-23 PROCEDURE — 99396 PREV VISIT EST AGE 40-64: CPT | Performed by: OBSTETRICS & GYNECOLOGY

## 2024-12-23 ASSESSMENT — ENCOUNTER SYMPTOMS
COLOR CHANGE: 0
ABDOMINAL DISTENTION: 0
VOMITING: 0
UNEXPECTED WEIGHT CHANGE: 0
SLEEP DISTURBANCE: 0
HEMATURIA: 0
FLANK PAIN: 0
BACK PAIN: 0
ABDOMINAL PAIN: 0
FEVER: 0
CONSTIPATION: 0
BLOOD IN STOOL: 0
DYSURIA: 0
SHORTNESS OF BREATH: 0
APPETITE CHANGE: 0
FATIGUE: 0
DIARRHEA: 0
CHILLS: 0
FREQUENCY: 0
NAUSEA: 0

## 2024-12-23 ASSESSMENT — PAIN SCALES - GENERAL: PAINLEVEL_OUTOF10: 0-NO PAIN

## 2024-12-23 NOTE — PROGRESS NOTES
Eveline Ruff is a 54 y.o.  here for well woman exam.    Medical and surgical histories reviewed with patient.     Concerns: none    Exercise: barre 3 classes    GynHx:  Cycles: none with IUD  Sexually active: yes with one partner   Contraception: Mirena IUD , placed 2020   No LMP recorded (lmp unknown). Patient has had an implant.     OB History          0    Para   0    Term   0       0    AB   0    Living   0         SAB   0    IAB   0    Ectopic   0    Multiple   0    Live Births   0                 Objective     Review of Systems   Constitutional:  Negative for appetite change, chills, fatigue, fever and unexpected weight change.   Respiratory:  Negative for shortness of breath.    Cardiovascular:  Negative for chest pain.   Gastrointestinal:  Negative for abdominal distention, abdominal pain, blood in stool, constipation, diarrhea, nausea and vomiting.   Endocrine: Negative for cold intolerance and heat intolerance.   Genitourinary:  Negative for dyspareunia, dysuria, flank pain, frequency, genital sores, hematuria, menstrual problem, pelvic pain, urgency, vaginal bleeding, vaginal discharge and vaginal pain.   Musculoskeletal:  Negative for back pain.   Skin:  Negative for color change.   Psychiatric/Behavioral:  Negative for sleep disturbance.        /80 (BP Location: Right arm, Patient Position: Sitting)   Ht 1.524 m (5')   Wt 62.1 kg (137 lb)   LMP  (LMP Unknown)   BMI 26.76 kg/m²     Physical Exam  Constitutional:       Appearance: Normal appearance.   HENT:      Head: Normocephalic and atraumatic.   Chest:   Breasts:     Right: Normal.      Left: Normal.   Abdominal:      General: Abdomen is flat.      Palpations: Abdomen is soft.      Tenderness: There is no abdominal tenderness.   Genitourinary:     General: Normal vulva.      Vagina: Normal.      Cervix: Normal.      Uterus: Normal.       Adnexa: Right adnexa normal and left adnexa normal.      Comments: +IUD thread  seen   Pap collected today    Skin:     General: Skin is warm and dry.   Neurological:      Mental Status: She is alert and oriented to person, place, and time.   Psychiatric:         Mood and Affect: Mood normal.          Assessment and Plan:  Routine Well Woman Exam Today.   Discussed diet and exercise and routine health screening.   Pap:pap done today   Recommend annual mammograms.  Last mammogram 12/20/24 - awaiting report  Cont Mirena IUD. Expires 2028.      No orders of the defined types were placed in this encounter.

## 2024-12-28 DIAGNOSIS — E78.2 MIXED HYPERLIPIDEMIA: ICD-10-CM

## 2024-12-31 PROCEDURE — RXMED WILLOW AMBULATORY MEDICATION CHARGE

## 2024-12-31 RX ORDER — ROSUVASTATIN CALCIUM 5 MG/1
5 TABLET, COATED ORAL DAILY
Qty: 90 TABLET | Refills: 3 | Status: SHIPPED | OUTPATIENT
Start: 2024-12-31 | End: 2025-12-31

## 2025-01-03 ENCOUNTER — LAB (OUTPATIENT)
Dept: LAB | Facility: LAB | Age: 55
End: 2025-01-03
Payer: COMMERCIAL

## 2025-01-03 DIAGNOSIS — Z00.00 ANNUAL PHYSICAL EXAM: ICD-10-CM

## 2025-01-03 LAB
25(OH)D3 SERPL-MCNC: 42 NG/ML (ref 30–100)
ALBUMIN SERPL BCP-MCNC: 4.6 G/DL (ref 3.4–5)
ALP SERPL-CCNC: 54 U/L (ref 33–110)
ALT SERPL W P-5'-P-CCNC: 24 U/L (ref 7–45)
ANION GAP SERPL CALC-SCNC: 10 MMOL/L (ref 10–20)
APPEARANCE UR: ABNORMAL
AST SERPL W P-5'-P-CCNC: 20 U/L (ref 9–39)
BASOPHILS # BLD AUTO: 0.05 X10*3/UL (ref 0–0.1)
BASOPHILS NFR BLD AUTO: 0.7 %
BILIRUB SERPL-MCNC: 0.6 MG/DL (ref 0–1.2)
BILIRUB UR STRIP.AUTO-MCNC: NEGATIVE MG/DL
BUN SERPL-MCNC: 12 MG/DL (ref 6–23)
CALCIUM SERPL-MCNC: 9.6 MG/DL (ref 8.6–10.6)
CHLORIDE SERPL-SCNC: 104 MMOL/L (ref 98–107)
CHOLEST SERPL-MCNC: 231 MG/DL (ref 0–199)
CHOLESTEROL/HDL RATIO: 3.2
CO2 SERPL-SCNC: 29 MMOL/L (ref 21–32)
COLOR UR: COLORLESS
CREAT SERPL-MCNC: 0.94 MG/DL (ref 0.5–1.05)
EGFRCR SERPLBLD CKD-EPI 2021: 73 ML/MIN/1.73M*2
EOSINOPHIL # BLD AUTO: 0.26 X10*3/UL (ref 0–0.7)
EOSINOPHIL NFR BLD AUTO: 3.9 %
ERYTHROCYTE [DISTWIDTH] IN BLOOD BY AUTOMATED COUNT: 12.4 % (ref 11.5–14.5)
EST. AVERAGE GLUCOSE BLD GHB EST-MCNC: 100 MG/DL
GLUCOSE SERPL-MCNC: 79 MG/DL (ref 74–99)
GLUCOSE UR STRIP.AUTO-MCNC: NORMAL MG/DL
HBA1C MFR BLD: 5.1 %
HCT VFR BLD AUTO: 42.6 % (ref 36–46)
HDLC SERPL-MCNC: 72 MG/DL
HGB BLD-MCNC: 14.2 G/DL (ref 12–16)
IMM GRANULOCYTES # BLD AUTO: 0.02 X10*3/UL (ref 0–0.7)
IMM GRANULOCYTES NFR BLD AUTO: 0.3 % (ref 0–0.9)
KETONES UR STRIP.AUTO-MCNC: NEGATIVE MG/DL
LDLC SERPL CALC-MCNC: 139 MG/DL
LEUKOCYTE ESTERASE UR QL STRIP.AUTO: ABNORMAL
LYMPHOCYTES # BLD AUTO: 2.52 X10*3/UL (ref 1.2–4.8)
LYMPHOCYTES NFR BLD AUTO: 37.6 %
MCH RBC QN AUTO: 31.1 PG (ref 26–34)
MCHC RBC AUTO-ENTMCNC: 33.3 G/DL (ref 32–36)
MCV RBC AUTO: 93 FL (ref 80–100)
MONOCYTES # BLD AUTO: 0.31 X10*3/UL (ref 0.1–1)
MONOCYTES NFR BLD AUTO: 4.6 %
NEUTROPHILS # BLD AUTO: 3.55 X10*3/UL (ref 1.2–7.7)
NEUTROPHILS NFR BLD AUTO: 52.9 %
NITRITE UR QL STRIP.AUTO: NEGATIVE
NON HDL CHOLESTEROL: 159 MG/DL (ref 0–149)
NRBC BLD-RTO: 0 /100 WBCS (ref 0–0)
PH UR STRIP.AUTO: 6.5 [PH]
PLATELET # BLD AUTO: 298 X10*3/UL (ref 150–450)
POTASSIUM SERPL-SCNC: 4.3 MMOL/L (ref 3.5–5.3)
PROT SERPL-MCNC: 7.4 G/DL (ref 6.4–8.2)
PROT UR STRIP.AUTO-MCNC: NEGATIVE MG/DL
RBC # BLD AUTO: 4.56 X10*6/UL (ref 4–5.2)
RBC # UR STRIP.AUTO: ABNORMAL /UL
RBC #/AREA URNS AUTO: NORMAL /HPF
SODIUM SERPL-SCNC: 139 MMOL/L (ref 136–145)
SP GR UR STRIP.AUTO: 1
TRIGL SERPL-MCNC: 100 MG/DL (ref 0–149)
TSH SERPL-ACNC: 4.02 MIU/L (ref 0.44–3.98)
UROBILINOGEN UR STRIP.AUTO-MCNC: NORMAL MG/DL
VLDL: 20 MG/DL (ref 0–40)
WBC # BLD AUTO: 6.7 X10*3/UL (ref 4.4–11.3)
WBC #/AREA URNS AUTO: NORMAL /HPF

## 2025-01-03 PROCEDURE — 81001 URINALYSIS AUTO W/SCOPE: CPT

## 2025-01-03 PROCEDURE — 80053 COMPREHEN METABOLIC PANEL: CPT

## 2025-01-03 PROCEDURE — 82306 VITAMIN D 25 HYDROXY: CPT

## 2025-01-03 PROCEDURE — 80061 LIPID PANEL: CPT

## 2025-01-03 PROCEDURE — 84443 ASSAY THYROID STIM HORMONE: CPT

## 2025-01-03 PROCEDURE — 85025 COMPLETE CBC W/AUTO DIFF WBC: CPT

## 2025-01-03 PROCEDURE — 83036 HEMOGLOBIN GLYCOSYLATED A1C: CPT

## 2025-01-07 ENCOUNTER — APPOINTMENT (OUTPATIENT)
Dept: INTEGRATIVE MEDICINE | Facility: CLINIC | Age: 55
End: 2025-01-07
Payer: COMMERCIAL

## 2025-01-07 DIAGNOSIS — M99.04 SEGMENTAL AND SOMATIC DYSFUNCTION OF SACRAL REGION: ICD-10-CM

## 2025-01-07 DIAGNOSIS — M54.2 CERVICALGIA: ICD-10-CM

## 2025-01-07 DIAGNOSIS — M54.59 MECHANICAL LOW BACK PAIN: ICD-10-CM

## 2025-01-07 DIAGNOSIS — M99.02 SEGMENTAL AND SOMATIC DYSFUNCTION OF THORACIC REGION: ICD-10-CM

## 2025-01-07 DIAGNOSIS — M79.10 MYALGIA: ICD-10-CM

## 2025-01-07 DIAGNOSIS — M99.03 SEGMENTAL AND SOMATIC DYSFUNCTION OF LUMBAR REGION: ICD-10-CM

## 2025-01-07 DIAGNOSIS — M79.9 POSTURAL STRAIN: ICD-10-CM

## 2025-01-07 DIAGNOSIS — M99.01 SEGMENTAL AND SOMATIC DYSFUNCTION OF CERVICAL REGION: Primary | ICD-10-CM

## 2025-01-07 PROCEDURE — 98941 CHIROPRACT MANJ 3-4 REGIONS: CPT | Performed by: CHIROPRACTOR

## 2025-01-07 PROCEDURE — 97112 NEUROMUSCULAR REEDUCATION: CPT | Performed by: CHIROPRACTOR

## 2025-01-07 NOTE — PROGRESS NOTES
Subjective   Patient ID: Eveline Ruff is a 54 y.o. female who presents January 7, 2025 for chiropractic care.    (1/20) VPCY    Today, the patient rates their degree of pain as a 4 out of 10 on the numeric pain rating scale.     Eveline returns for continued chiropractic care. She states that she has been experiencing increased discomfort around the shoulder and tightness in the low back. The patient denies any changes in health since her last encounter and will follow up as scheduled.   ________________________________________________________________  INITIAL HISTORY (07/02/2020):  Neck pain - 2 month worsening of constant 5/10 NRS Neck pain, radiates into BL entire UEs with tingling mostly at night, or occasionally during day. No trauma, numbness. SLeeps with arms/hands up near pillow. Tingling in arms/hands is chronic, occurred at least 2+ years. Saw Ortho, was Dx with CTS via electrodiagnostics, and had injections which reportedly helped. Notes neck pain is new addition to the numbness symptoms. Relates being working from home due to pandemic, possibly not having as comfortable of a chair.       Objective   Physical Exam  Neurological:      General: No focal deficit present.      Mental Status: She is alert and oriented to person, place, and time.      Cranial Nerves: No dysarthria or facial asymmetry.      Sensory: Sensation is intact.      Motor: Motor function is intact.      Coordination: Coordination is intact.      Gait: Gait is intact.       Palpation of the following regions revealed:  Cervical: Scalenes bilateral, muscular hypertonicity.  Upper trapezius bilateral, muscular hypertonicity.  Levator scapulae bilateral, muscular hypertonicity.  Cervical paraspinals bilateral, muscular hypertonicity.  Thoracic: Thoracic paraspinals bilateral, muscular hypertonicity.  Middle trapezius bilateral, muscular hypertonicity.  Rhomboids bilateral, muscular hypertonicity.  Lumbar: Lumbar paraspinals bilateral,  muscular hypertonicity.  Quadratus lumborum bilateral, muscular hypertonicity.  Gluteal bilateral, muscular hypertonicity.    Segmental Joint(s): Segmental joint dysfunction was assessed with motion palpation and is identified in the following areas:  Cervical : C2 C4  Thoracic : T4, T5, and T8  Lumbopelvic / Sacral SIJ : L2, L5/S1, and R SIJ    Assessment/Plan   Today's Treatment Included: Spinal manipulation to the following regions of segmental dysfunction identified on examination, using age-appropriate and injury specific force. Segmental Joint(s) Cervical : C2 C4 C5 Segmental Joint(s) Thoracic : T4, T5, T6, and T8 Segmental Joint(s) Lumbopelvic/Sacral SIJ : L4, L5/S1, R SIJ, and L SIJ  Chiropractic manipulation treatment techniques utilized: Diversified CMT, Pelvic drop table technique, and Pelvic blocking technique  Soft tissue mobilization techniques utilized during treatment: Pin and Stretch, IASTM/Graston, and Cupping  Integrative Dry Needling (IDN) - Needles in/out:  6.  Neuromuscular Re-Education (29495): 2 Units; Start time: 4:10p  End time: 4:40p      Soft-tissue mobilization was performed in the following areas:  Cervical paraspinal mm. bilateral, Scalenes bilateral, Upper Trapezius bilateral, and Levator Scap. bilateral  Thoracic Paraspinal mm. bilateral, Middle Trapezius bilateral, Rhomboids bilateral, Pectoralis bilateral, Subscapularis bilateral, and Latissimus Dorsi bilateral  Lumbar Paraspinal mm. bilateral, Quadratus Lumborum bilateral, and Gluteal mm. Glute. Max.  and Glute. Med. bilateral    Recommended follow-up in 1 month(s).     The patient tolerated today's treatment with little or no additional discomfort and was instructed to contact the office for questions or concerns.

## 2025-01-09 LAB
CYTOLOGY CMNT CVX/VAG CYTO-IMP: NORMAL
HPV HR 12 DNA GENITAL QL NAA+PROBE: NEGATIVE
HPV HR GENOTYPES PNL CVX NAA+PROBE: NEGATIVE
HPV16 DNA SPEC QL NAA+PROBE: NEGATIVE
HPV18 DNA SPEC QL NAA+PROBE: NEGATIVE
LAB AP HPV GENOTYPE QUESTION: YES
LAB AP HPV HR: NORMAL
LABORATORY COMMENT REPORT: NORMAL
PATH REPORT.TOTAL CANCER: NORMAL

## 2025-01-10 ENCOUNTER — APPOINTMENT (OUTPATIENT)
Dept: PRIMARY CARE | Facility: CLINIC | Age: 55
End: 2025-01-10
Payer: COMMERCIAL

## 2025-01-10 ENCOUNTER — PHARMACY VISIT (OUTPATIENT)
Dept: PHARMACY | Facility: CLINIC | Age: 55
End: 2025-01-10
Payer: COMMERCIAL

## 2025-01-10 VITALS
BODY MASS INDEX: 26.24 KG/M2 | TEMPERATURE: 97.3 F | OXYGEN SATURATION: 99 % | HEART RATE: 84 BPM | DIASTOLIC BLOOD PRESSURE: 60 MMHG | WEIGHT: 139 LBS | HEIGHT: 61 IN | SYSTOLIC BLOOD PRESSURE: 100 MMHG

## 2025-01-10 DIAGNOSIS — H69.93 DYSFUNCTION OF BOTH EUSTACHIAN TUBES: ICD-10-CM

## 2025-01-10 DIAGNOSIS — E03.9 ACQUIRED HYPOTHYROIDISM: Primary | ICD-10-CM

## 2025-01-10 DIAGNOSIS — I10 PRIMARY HYPERTENSION: ICD-10-CM

## 2025-01-10 DIAGNOSIS — E78.2 MIXED HYPERLIPIDEMIA: ICD-10-CM

## 2025-01-10 DIAGNOSIS — I10 HYPERTENSION, UNSPECIFIED TYPE: ICD-10-CM

## 2025-01-10 PROCEDURE — RXMED WILLOW AMBULATORY MEDICATION CHARGE

## 2025-01-10 PROCEDURE — 3078F DIAST BP <80 MM HG: CPT | Performed by: FAMILY MEDICINE

## 2025-01-10 PROCEDURE — 3074F SYST BP LT 130 MM HG: CPT | Performed by: FAMILY MEDICINE

## 2025-01-10 PROCEDURE — 3008F BODY MASS INDEX DOCD: CPT | Performed by: FAMILY MEDICINE

## 2025-01-10 PROCEDURE — 99396 PREV VISIT EST AGE 40-64: CPT | Performed by: FAMILY MEDICINE

## 2025-01-10 RX ORDER — ROSUVASTATIN CALCIUM 10 MG/1
10 TABLET, COATED ORAL DAILY
Qty: 100 TABLET | Refills: 3 | Status: SHIPPED | OUTPATIENT
Start: 2025-01-10 | End: 2026-02-14

## 2025-01-10 RX ORDER — BUPROPION HYDROCHLORIDE 300 MG/1
300 TABLET ORAL DAILY
Qty: 90 TABLET | Refills: 3 | Status: SHIPPED | OUTPATIENT
Start: 2025-01-10 | End: 2026-01-10

## 2025-01-10 RX ORDER — LOSARTAN POTASSIUM 50 MG/1
50 TABLET ORAL DAILY
Qty: 90 TABLET | Refills: 3 | Status: SHIPPED | OUTPATIENT
Start: 2025-01-10 | End: 2026-01-10

## 2025-01-10 RX ORDER — METHYLPREDNISOLONE 4 MG/1
TABLET ORAL
Qty: 21 TABLET | Refills: 0 | Status: SHIPPED | OUTPATIENT
Start: 2025-01-10 | End: 2025-01-16

## 2025-01-10 RX ORDER — ROSUVASTATIN CALCIUM 5 MG/1
5 TABLET, COATED ORAL DAILY
Qty: 90 TABLET | Refills: 3 | Status: CANCELLED | OUTPATIENT
Start: 2025-01-10 | End: 2026-01-10

## 2025-01-10 RX ORDER — LEVOTHYROXINE SODIUM 25 UG/1
25 TABLET ORAL DAILY
Qty: 60 TABLET | Refills: 3 | Status: SHIPPED | OUTPATIENT
Start: 2025-01-10 | End: 2026-01-10

## 2025-01-10 ASSESSMENT — PAIN SCALES - GENERAL: PAINLEVEL_OUTOF10: 0-NO PAIN

## 2025-01-13 ENCOUNTER — PHARMACY VISIT (OUTPATIENT)
Dept: PHARMACY | Facility: CLINIC | Age: 55
End: 2025-01-13
Payer: COMMERCIAL

## 2025-01-14 PROCEDURE — RXMED WILLOW AMBULATORY MEDICATION CHARGE

## 2025-01-14 ASSESSMENT — ENCOUNTER SYMPTOMS
DYSURIA: 0
SORE THROAT: 0
BLOOD IN STOOL: 0
DIZZINESS: 0
UNEXPECTED WEIGHT CHANGE: 0
ABDOMINAL PAIN: 0
NUMBNESS: 0
NAUSEA: 0
FEVER: 0
HEADACHES: 0
CONFUSION: 0
EYE PAIN: 0
HEMATURIA: 0
SHORTNESS OF BREATH: 0
JOINT SWELLING: 0
HALLUCINATIONS: 0
FREQUENCY: 0
COUGH: 0
DECREASED CONCENTRATION: 0
TROUBLE SWALLOWING: 0
FATIGUE: 1
DIARRHEA: 0
PALPITATIONS: 0
VOMITING: 0
WEAKNESS: 0

## 2025-01-14 NOTE — PROGRESS NOTES
Subjective   Patient ID: Eveline Ruff is a 54 y.o. female.    Eveline Ruff comes in today for physical exam.  There has been no chest pain, shortness of breath, fever, chills, unexplained weight loss, rectal bleeding or any other unusual symptoms. Ears plugged, congested. No fever, no discharge.  BP stable. Cholesterol at goal. TSH slightly elevated. Discussed sx and tx.  Body mass index is 26.48 kg/m².  Recent labs, diagnostics and pertinent information has been reviewed. Patient is attempting to eat a healthy diet and incorporate exercise in to their lifestyle. Patient does not smoke. Alcohol in moderation. Patient is practicing routine eye and dental care. Reviewed employment status. No uncontrolled anxiety, depression. Reviewed current medications, if any.          Review of Systems   Constitutional:  Positive for fatigue. Negative for fever and unexpected weight change.   HENT:  Negative for congestion, ear pain, hearing loss, sore throat and trouble swallowing.    Eyes:  Negative for pain and visual disturbance.   Respiratory:  Negative for cough and shortness of breath.    Cardiovascular:  Negative for chest pain, palpitations and leg swelling.   Gastrointestinal:  Negative for abdominal pain, blood in stool, diarrhea, nausea and vomiting.   Genitourinary:  Negative for dysuria, frequency, hematuria and urgency.   Musculoskeletal:  Negative for joint swelling.   Skin:  Negative for pallor and rash.   Neurological:  Negative for dizziness, syncope, weakness, numbness and headaches.   Psychiatric/Behavioral:  Negative for confusion, decreased concentration, hallucinations and suicidal ideas.      Vitals:    01/10/25 1502   BP: 100/60   Pulse: 84   Temp: 36.3 °C (97.3 °F)   SpO2: 99%      Objective   Physical Exam  Constitutional:       Appearance: Normal appearance.   HENT:      Head: Normocephalic and atraumatic.      Right Ear: Tympanic membrane and external ear normal.      Left Ear: Tympanic membrane  and external ear normal.      Nose: Nose normal.      Mouth/Throat:      Mouth: Mucous membranes are moist.      Pharynx: Oropharynx is clear. No oropharyngeal exudate.   Eyes:      Extraocular Movements: Extraocular movements intact.      Conjunctiva/sclera: Conjunctivae normal.      Pupils: Pupils are equal, round, and reactive to light.   Cardiovascular:      Rate and Rhythm: Normal rate and regular rhythm.      Heart sounds: Normal heart sounds.   Pulmonary:      Effort: Pulmonary effort is normal.      Breath sounds: Normal breath sounds.   Abdominal:      General: Abdomen is flat.      Palpations: Abdomen is soft. There is no mass.      Tenderness: There is no abdominal tenderness. There is no guarding.   Musculoskeletal:      Cervical back: Neck supple.   Lymphadenopathy:      Cervical: No cervical adenopathy.   Skin:     General: Skin is warm and dry.   Neurological:      General: No focal deficit present.      Mental Status: She is alert.   Psychiatric:         Mood and Affect: Mood normal.         Speech: Speech normal.         Behavior: Behavior normal.         Cognition and Memory: Cognition normal.         @lab@    Assessment/Plan   Diagnoses and all orders for this visit:  Acquired hypothyroidism  -     levothyroxine (Synthroid, Levoxyl) 25 mcg tablet; Take 1 tablet (25 mcg) by mouth early in the morning.. Take on an empty stomach at the same time each day, either 30 to 60 minutes prior to breakfast  Mixed hyperlipidemia  -     buPROPion XL (Wellbutrin XL) 300 mg 24 hr tablet; Take 1 tablet (300 mg) by mouth once daily. Do not crush, chew, or split.  -     losartan (Cozaar) 50 mg tablet; TAKE 1 TABLET BY MOUTH ONCE DAILY  -     rosuvastatin (Crestor) 10 mg tablet; Take 1 tablet (10 mg) by mouth once daily.  Primary hypertension  -     buPROPion XL (Wellbutrin XL) 300 mg 24 hr tablet; Take 1 tablet (300 mg) by mouth once daily. Do not crush, chew, or split.  -     losartan (Cozaar) 50 mg tablet; TAKE 1  TABLET BY MOUTH ONCE DAILY  Hypertension, unspecified type  -     losartan (Cozaar) 50 mg tablet; TAKE 1 TABLET BY MOUTH ONCE DAILY  Dysfunction of both eustachian tubes  -     methylPREDNISolone (Medrol Dospak) 4 mg tablets; Take as directed on package.

## 2025-01-16 ENCOUNTER — PHARMACY VISIT (OUTPATIENT)
Dept: PHARMACY | Facility: CLINIC | Age: 55
End: 2025-01-16
Payer: COMMERCIAL

## 2025-02-03 ENCOUNTER — APPOINTMENT (OUTPATIENT)
Dept: INTEGRATIVE MEDICINE | Facility: CLINIC | Age: 55
End: 2025-02-03
Payer: COMMERCIAL

## 2025-02-28 PROCEDURE — RXMED WILLOW AMBULATORY MEDICATION CHARGE

## 2025-03-04 ENCOUNTER — APPOINTMENT (OUTPATIENT)
Dept: INTEGRATIVE MEDICINE | Facility: CLINIC | Age: 55
End: 2025-03-04
Payer: COMMERCIAL

## 2025-03-04 DIAGNOSIS — M99.01 SEGMENTAL AND SOMATIC DYSFUNCTION OF CERVICAL REGION: Primary | ICD-10-CM

## 2025-03-04 DIAGNOSIS — M99.04 SEGMENTAL AND SOMATIC DYSFUNCTION OF SACRAL REGION: ICD-10-CM

## 2025-03-04 DIAGNOSIS — M79.10 MYALGIA: ICD-10-CM

## 2025-03-04 DIAGNOSIS — M54.2 CERVICALGIA: ICD-10-CM

## 2025-03-04 DIAGNOSIS — M99.02 SEGMENTAL AND SOMATIC DYSFUNCTION OF THORACIC REGION: ICD-10-CM

## 2025-03-04 DIAGNOSIS — M54.59 MECHANICAL LOW BACK PAIN: ICD-10-CM

## 2025-03-04 DIAGNOSIS — M99.03 SEGMENTAL AND SOMATIC DYSFUNCTION OF LUMBAR REGION: ICD-10-CM

## 2025-03-04 DIAGNOSIS — M79.9 POSTURAL STRAIN: ICD-10-CM

## 2025-03-04 PROCEDURE — 97112 NEUROMUSCULAR REEDUCATION: CPT | Performed by: CHIROPRACTOR

## 2025-03-04 PROCEDURE — 98941 CHIROPRACT MANJ 3-4 REGIONS: CPT | Performed by: CHIROPRACTOR

## 2025-03-04 NOTE — PROGRESS NOTES
Subjective   Patient ID: Eveline Ruff is a 54 y.o. female who presents March 4, 2025 for chiropractic care.    (2/20) VPCY    Today, the patient rates their degree of pain as a 4 out of 10 on the numeric pain rating scale.     Eveline returns for continued chiropractic care. She states that she has been doing well. She reports that she had a great trip out to [place] but with travel, she notes some stiffness in the low back and the shoulders. The patient denies any changes in health since her last encounter and will follow up as scheduled.   ________________________________________________________________  INITIAL HISTORY (07/02/2020):  Neck pain - 2 month worsening of constant 5/10 NRS Neck pain, radiates into BL entire UEs with tingling mostly at night, or occasionally during day. No trauma, numbness. SLeeps with arms/hands up near pillow. Tingling in arms/hands is chronic, occurred at least 2+ years. Saw Ortho, was Dx with CTS via electrodiagnostics, and had injections which reportedly helped. Notes neck pain is new addition to the numbness symptoms. Relates being working from home due to pandemic, possibly not having as comfortable of a chair.       Objective   Physical Exam  Neurological:      General: No focal deficit present.      Mental Status: She is alert and oriented to person, place, and time.      Cranial Nerves: No dysarthria or facial asymmetry.      Sensory: Sensation is intact.      Motor: Motor function is intact.      Coordination: Coordination is intact.      Gait: Gait is intact.       Palpation of the following regions revealed:  Cervical: Scalenes bilateral, muscular hypertonicity.  Upper trapezius bilateral, muscular hypertonicity.  Levator scapulae bilateral, muscular hypertonicity.  Cervical paraspinals bilateral, muscular hypertonicity.  Thoracic: Thoracic paraspinals bilateral, muscular hypertonicity.  Middle trapezius bilateral, muscular hypertonicity.  Rhomboids bilateral, muscular  hypertonicity.  Lumbar: Lumbar paraspinals bilateral, muscular hypertonicity.  Quadratus lumborum bilateral, muscular hypertonicity.  Gluteal bilateral, muscular hypertonicity.    Segmental Joint(s): Segmental joint dysfunction was assessed with motion palpation and is identified in the following areas:  Cervical : C2 C4  Thoracic : T4, T5, and T8  Lumbopelvic / Sacral SIJ : L2, L5/S1, and R SIJ    Assessment/Plan   Today's Treatment Included: Spinal manipulation to the following regions of segmental dysfunction identified on examination, using age-appropriate and injury specific force. Segmental Joint(s) Cervical : C2 C4 C5 Segmental Joint(s) Thoracic : T4, T5, T6, and T8 Segmental Joint(s) Lumbopelvic/Sacral SIJ : L4, L5/S1, R SIJ, and L SIJ  Chiropractic manipulation treatment techniques utilized: Diversified CMT, Pelvic drop table technique, and Pelvic blocking technique  Soft tissue mobilization techniques utilized during treatment: Pin and Stretch, IASTM/Graston, and Cupping  Integrative Dry Needling (IDN) - Needles in/out:  6.  Neuromuscular Re-Education (41185): 2 Units; Start time: 4:10p  End time: 4:40p      Soft-tissue mobilization was performed in the following areas:  Cervical paraspinal mm. bilateral, Scalenes bilateral, Upper Trapezius bilateral, and Levator Scap. bilateral  Thoracic Paraspinal mm. bilateral, Middle Trapezius bilateral, Rhomboids bilateral, Pectoralis bilateral, Subscapularis bilateral, and Latissimus Dorsi bilateral  Lumbar Paraspinal mm. bilateral, Quadratus Lumborum bilateral, and Gluteal mm. Glute. Max.  and Glute. Med. bilateral    Recommended follow-up in 1 month(s).     The patient tolerated today's treatment with little or no additional discomfort and was instructed to contact the office for questions or concerns.

## 2025-03-05 ENCOUNTER — PHARMACY VISIT (OUTPATIENT)
Dept: PHARMACY | Facility: CLINIC | Age: 55
End: 2025-03-05
Payer: COMMERCIAL

## 2025-03-07 ENCOUNTER — HOSPITAL ENCOUNTER (OUTPATIENT)
Dept: RADIOLOGY | Facility: CLINIC | Age: 55
Discharge: HOME | End: 2025-03-07
Payer: COMMERCIAL

## 2025-03-07 ENCOUNTER — OFFICE VISIT (OUTPATIENT)
Dept: ORTHOPEDIC SURGERY | Facility: CLINIC | Age: 55
End: 2025-03-07
Payer: COMMERCIAL

## 2025-03-07 DIAGNOSIS — M54.50 LUMBAR BACK PAIN: ICD-10-CM

## 2025-03-07 DIAGNOSIS — S39.012A LUMBAR STRAIN, INITIAL ENCOUNTER: Primary | ICD-10-CM

## 2025-03-07 PROCEDURE — 99214 OFFICE O/P EST MOD 30 MIN: CPT | Performed by: STUDENT IN AN ORGANIZED HEALTH CARE EDUCATION/TRAINING PROGRAM

## 2025-03-07 PROCEDURE — 72110 X-RAY EXAM L-2 SPINE 4/>VWS: CPT

## 2025-03-07 RX ORDER — MELOXICAM 15 MG/1
15 TABLET ORAL DAILY
Qty: 30 TABLET | Refills: 0 | Status: SHIPPED | OUTPATIENT
Start: 2025-03-07 | End: 2025-04-06

## 2025-03-07 RX ORDER — METHYLPREDNISOLONE 4 MG/1
TABLET ORAL
Qty: 21 TABLET | Refills: 0 | Status: SHIPPED | OUTPATIENT
Start: 2025-03-07 | End: 2025-03-13

## 2025-03-07 RX ORDER — METHYLPREDNISOLONE 4 MG/1
TABLET ORAL
Qty: 21 TABLET | Refills: 0 | Status: SHIPPED | OUTPATIENT
Start: 2025-03-07 | End: 2025-03-07 | Stop reason: ENTERED-IN-ERROR

## 2025-03-07 ASSESSMENT — PAIN - FUNCTIONAL ASSESSMENT: PAIN_FUNCTIONAL_ASSESSMENT: 0-10

## 2025-03-07 ASSESSMENT — PAIN SCALES - GENERAL: PAINLEVEL_OUTOF10: 8

## 2025-03-07 NOTE — PROGRESS NOTES
Orthopaedic Spine Surgery Clinic Note    Patient ID: Eveline Ruff is a 54 y.o. female.    Chief Complaint  Chief Complaint   Patient presents with    Lower Back - Pain       History of Present Illness  Ms. Ruff is a pleasant 54-year-old female who presents for initial evaluation of acute onset low back pain.  Patient states that last night, she started experiencing pain in the right side of her lower back after she bent down to fix a rug in her bathroom.  She relates mechanical, axial low back pain on the right-hand side of her low back that worsens with activity and ambulation.  She does note that she is having some improvement in her pain as of this morning, but it is still bothering her.  She has a noticeable limp while ambulating.  She denies any lower extremity radiculopathy.  Denies numbness or paresthesias.  Denies bowel or bladder control issues.  Patient does see a chiropractor approximately once every other month.    Prior treatments:  As needed Advil  Has tried sitting on a doughnut overnight    Relevant PMH/PSH for spine    Past Medical History:   Diagnosis Date    Acute upper respiratory infection, unspecified 03/03/2016    Acute URI    CTS (carpal tunnel syndrome)     Dry eye syndrome of unspecified lacrimal gland 07/21/2013    Dry eye syndrome    Encounter for contraceptive management, unspecified 02/13/2020    Contraception management    Encounter for gynecological examination (general) (routine) without abnormal findings 10/10/2016    Visit for gynecologic examination    Hypertension     Unspecified injury of left foot, initial encounter 04/24/2016    Injury of left foot, initial encounter       Past Surgical History:   Procedure Laterality Date    ADENOIDECTOMY  09/05/2014    Adenoidectomy       Social History     Socioeconomic History    Marital status: Single   Tobacco Use    Smoking status: Never    Smokeless tobacco: Never   Vaping Use    Vaping status: Never Used   Substance and Sexual  Activity    Alcohol use: Yes     Alcohol/week: 2.0 standard drinks of alcohol     Types: 2 Standard drinks or equivalent per week     Comment: socially    Drug use: Never    Sexual activity: Yes     Partners: Male     Birth control/protection: I.U.D.       Family History   Problem Relation Name Age of Onset    Other (Urinary Bladder Cancer) Mother Mom     Rheum arthritis Mother Mom     Hyperlipidemia Mother Mom     Hypertension Father Dad     Pancreatic cancer Father Dad     Skin cancer Father Dad     Cancer Father Dad        No Known Allergies    Current Outpatient Medications   Medication Instructions    buPROPion XL (WELLBUTRIN XL) 300 mg, oral, Daily, Do not crush, chew, or split.    levonorgestrel (Mirena) 21 mcg/24 hours (8 yrs) 52 mg IUD 1 each, Once    levothyroxine (SYNTHROID, LEVOXYL) 25 mcg, oral, Daily, Take on an empty stomach at the same time each day, either 30 to 60 minutes prior to breakfast    losartan (Cozaar) 50 mg tablet TAKE 1 TABLET BY MOUTH ONCE DAILY    meloxicam (MOBIC) 15 mg, oral, Daily, Take only after completing Medrol dosepack.    methylPREDNISolone (Medrol Dospak) 4 mg tablets Follow schedule on package instructions    onabotulinumtoxinA (Botox) 100 unit injection Phyisician to inject 50 units subcutaneously into each axilla    rosuvastatin (CRESTOR) 5 mg, oral, Daily    rosuvastatin (CRESTOR) 10 mg, oral, Daily         Vitals & Measurements  There were no vitals filed for this visit.     Ortho Exam  General: AO x 3, NAD  Cardio: examined extremities perfused by peripheral palpation  Resp: breathing unlabored  Gait: Antalgic, noticeable limp  Tenderness: +TTP over right paraspinal region, directly point tender over the PSIS    Lower Extremity  Right  Left  IP   5/5  5/5  Quadriceps  5/5  5/5  Tibialis anterior  5/5  5/5  EHL   5/5  5/5  Gastrocnemius  5/5  5/5    Sensation: Normal to light touch throughout lower extremities bilaterally.    Reflexes:  Right   Left  Q  1+  2+  A   2+    2+    Clonus: negative  Babinski: WNL  Straight leg raise: negative    Hip:  ESTEFANY and FADIR maneuvering produced right-sided low back pain  No trochanteric tenderness to palpation      Diagnostic Results - Imaging    XR lumbar spine today, 3/7/2025  FINDINGS:  Lumbar degenerative changes diffusely, moderate in degree with a  moderate S shaped scoliosis.      No subluxation or fracture. No pathologic motion.      IMPRESSION:  Moderate degenerative changes with scoliosis. No acute findings  lumbar spine.        Diagnosis  Encounter Diagnoses   Name Primary?    Lumbar back pain     Lumbar strain, initial encounter Yes          Assessment/Plan  Ms. Ruff is a pleasant 54-year-old female who presents for initial evaluation of acute onset low back pain.  Patient relates pain on the right side of her low back that worsens when she bends down or with activity/ambulation.  Prolonged sitting also seems to exacerbate her pain.  Her pain started last night after she bent down to fix a rug in her bathroom.  She denies any lower extremity radiculopathy.  Denies bowel or bladder control issues.  She exhibits no red flags or neurologic symptoms.  Her XR evaluation today in the office demonstrates a prominent thoracolumbar scoliosis.    I had an extensive discussion with the patient in the office today with regards to her symptoms, her imaging findings, and possible management options.  We discussed how her symptoms likely relate to a lumbar muscular/myofascial strain.  We did discuss her scoliosis and how this may predispose her to muscular/myofascial strains given the differential muscle tensioning on her paraspinals.  We discussed initiating nonsurgical management.  I offered medications, including a Medrol Dosepak which she can take for her acute pain flare.  Once she comes off of this, we can initiate a course of meloxicam 15 mg/day to be taken on an as-needed basis for her pain.  We discussed not taking this with other  NSAIDs and briefly discussed the risk/side effect profile.  I did offer physical therapy for a low back stretching and strengthening program, however patient would like to try medication management at this time before moving forward with any form of manual treatment/therapy.    Patient will follow-up with me on an as-needed basis if her symptoms fail to improve or worsen.  I provided a business card with my office contact information. After our discussion, the patient articulated understanding of the plan and felt that all questions had been answered satisfactorily. The patient was pleased with the visit and very appreciative for the care rendered.    **Please excuse any errors in grammar or translation related to this dictation. Voice recognition software was utilized to prepare this document. **    F/u PRN.     Orders Placed This Encounter    XR lumbar spine complete 4+ views    DISCONTD: methylPREDNISolone (Medrol Dospak) 4 mg tablets    meloxicam (Mobic) 15 mg tablet    methylPREDNISolone (Medrol Dospak) 4 mg tablets       --    Leroy Morales MD  Orthopaedic Spine Surgery  , Department of Orthopaedic Surgery  [facility]

## 2025-03-09 PROCEDURE — RXMED WILLOW AMBULATORY MEDICATION CHARGE

## 2025-03-13 ENCOUNTER — PHARMACY VISIT (OUTPATIENT)
Dept: PHARMACY | Facility: CLINIC | Age: 55
End: 2025-03-13
Payer: COMMERCIAL

## 2025-04-10 PROCEDURE — RXMED WILLOW AMBULATORY MEDICATION CHARGE

## 2025-04-11 ENCOUNTER — PHARMACY VISIT (OUTPATIENT)
Dept: PHARMACY | Facility: CLINIC | Age: 55
End: 2025-04-11
Payer: COMMERCIAL

## 2025-04-16 PROCEDURE — RXMED WILLOW AMBULATORY MEDICATION CHARGE

## 2025-04-18 ENCOUNTER — PHARMACY VISIT (OUTPATIENT)
Dept: PHARMACY | Facility: CLINIC | Age: 55
End: 2025-04-18
Payer: COMMERCIAL

## 2025-04-22 PROCEDURE — RXMED WILLOW AMBULATORY MEDICATION CHARGE

## 2025-04-23 ENCOUNTER — PHARMACY VISIT (OUTPATIENT)
Dept: PHARMACY | Facility: CLINIC | Age: 55
End: 2025-04-23
Payer: COMMERCIAL

## 2025-05-20 ENCOUNTER — APPOINTMENT (OUTPATIENT)
Dept: INTEGRATIVE MEDICINE | Facility: CLINIC | Age: 55
End: 2025-05-20
Payer: COMMERCIAL

## 2025-05-20 DIAGNOSIS — M99.02 SEGMENTAL AND SOMATIC DYSFUNCTION OF THORACIC REGION: ICD-10-CM

## 2025-05-20 DIAGNOSIS — M79.9 POSTURAL STRAIN: ICD-10-CM

## 2025-05-20 DIAGNOSIS — M54.59 MECHANICAL LOW BACK PAIN: ICD-10-CM

## 2025-05-20 DIAGNOSIS — M99.01 SEGMENTAL AND SOMATIC DYSFUNCTION OF CERVICAL REGION: Primary | ICD-10-CM

## 2025-05-20 DIAGNOSIS — M54.2 CERVICALGIA: ICD-10-CM

## 2025-05-20 DIAGNOSIS — M99.04 SEGMENTAL AND SOMATIC DYSFUNCTION OF SACRAL REGION: ICD-10-CM

## 2025-05-20 DIAGNOSIS — M79.10 MYALGIA: ICD-10-CM

## 2025-05-20 DIAGNOSIS — M99.03 SEGMENTAL AND SOMATIC DYSFUNCTION OF LUMBAR REGION: ICD-10-CM

## 2025-05-20 PROCEDURE — 97112 NEUROMUSCULAR REEDUCATION: CPT | Performed by: CHIROPRACTOR

## 2025-05-20 PROCEDURE — 98941 CHIROPRACT MANJ 3-4 REGIONS: CPT | Performed by: CHIROPRACTOR

## 2025-05-20 NOTE — PROGRESS NOTES
Subjective   Patient ID: Eveline Ruff is a 55 y.o. female who presents May 20, 2025 for chiropractic care.    (2/20) VPCY    Today, the patient rates their degree of pain as a 4 out of 10 on the numeric pain rating scale.     Eveline returns for continued chiropractic care. She states that she has been feeling increased stiffness in the neck and shoulders. She states that she finds that routine care has helped her manage her symptoms. The patient denies any changes in health since her last encounter and will follow up as scheduled.   ________________________________________________________________  INITIAL HISTORY (07/02/2020):  Neck pain - 2 month worsening of constant 5/10 NRS Neck pain, radiates into BL entire UEs with tingling mostly at night, or occasionally during day. No trauma, numbness. SLeeps with arms/hands up near pillow. Tingling in arms/hands is chronic, occurred at least 2+ years. Saw Ortho, was Dx with CTS via electrodiagnostics, and had injections which reportedly helped. Notes neck pain is new addition to the numbness symptoms. Relates being working from home due to pandemic, possibly not having as comfortable of a chair.       Objective   Physical Exam  Neurological:      General: No focal deficit present.      Mental Status: She is alert and oriented to person, place, and time.      Cranial Nerves: No dysarthria or facial asymmetry.      Sensory: Sensation is intact.      Motor: Motor function is intact.      Coordination: Coordination is intact.      Gait: Gait is intact.       Palpation of the following regions revealed:  Cervical: Scalenes bilateral, muscular hypertonicity.  Upper trapezius bilateral, muscular hypertonicity.  Levator scapulae bilateral, muscular hypertonicity.  Cervical paraspinals bilateral, muscular hypertonicity.  Thoracic: Thoracic paraspinals bilateral, muscular hypertonicity.  Middle trapezius bilateral, muscular hypertonicity.  Rhomboids bilateral, muscular  hypertonicity.  Lumbar: Lumbar paraspinals bilateral, muscular hypertonicity.  Quadratus lumborum bilateral, muscular hypertonicity.  Gluteal bilateral, muscular hypertonicity.    Segmental Joint(s): Segmental joint dysfunction was assessed with motion palpation and is identified in the following areas:  Cervical : C2 C4  Thoracic : T4, T5, and T8  Lumbopelvic / Sacral SIJ : L2, L5/S1, and R SIJ    Assessment/Plan   Today's Treatment Included: Spinal manipulation to the following regions of segmental dysfunction identified on examination, using age-appropriate and injury specific force. Segmental Joint(s) Cervical : C2 C4 C5 Segmental Joint(s) Thoracic : T4, T5, T6, and T8 Segmental Joint(s) Lumbopelvic/Sacral SIJ : L4, L5/S1, R SIJ, and L SIJ  Chiropractic manipulation treatment techniques utilized: Diversified CMT, Pelvic drop table technique, and Pelvic blocking technique  Soft tissue mobilization techniques utilized during treatment: Pin and Stretch, IASTM/Graston, and Cupping  Integrative Dry Needling (IDN) - Needles in/out:  6.  Neuromuscular Re-Education (84014): 2 Units; Start time: 4:20p  End time: 4:50p      Soft-tissue mobilization was performed in the following areas:  Cervical paraspinal mm. bilateral, Scalenes bilateral, Upper Trapezius bilateral, and Levator Scap. bilateral  Thoracic Paraspinal mm. bilateral, Middle Trapezius bilateral, Rhomboids bilateral, Pectoralis bilateral, Subscapularis bilateral, and Latissimus Dorsi bilateral  Lumbar Paraspinal mm. bilateral, Quadratus Lumborum bilateral, and Gluteal mm. Glute. Max.  and Glute. Med. bilateral    Recommended follow-up in 1 month(s).     The patient tolerated today's treatment with little or no additional discomfort and was instructed to contact the office for questions or concerns.

## 2025-06-04 PROCEDURE — RXMED WILLOW AMBULATORY MEDICATION CHARGE

## 2025-06-05 ENCOUNTER — PHARMACY VISIT (OUTPATIENT)
Dept: PHARMACY | Facility: CLINIC | Age: 55
End: 2025-06-05
Payer: COMMERCIAL

## 2025-06-11 ENCOUNTER — APPOINTMENT (OUTPATIENT)
Dept: GASTROENTEROLOGY | Facility: CLINIC | Age: 55
End: 2025-06-11
Payer: COMMERCIAL

## 2025-07-09 PROCEDURE — RXMED WILLOW AMBULATORY MEDICATION CHARGE

## 2025-07-10 ENCOUNTER — APPOINTMENT (OUTPATIENT)
Dept: DERMATOLOGY | Facility: CLINIC | Age: 55
End: 2025-07-10
Payer: COMMERCIAL

## 2025-07-10 ENCOUNTER — PHARMACY VISIT (OUTPATIENT)
Dept: PHARMACY | Facility: CLINIC | Age: 55
End: 2025-07-10
Payer: COMMERCIAL

## 2025-07-15 DIAGNOSIS — E78.5 DYSLIPIDEMIA: Primary | ICD-10-CM

## 2025-07-15 DIAGNOSIS — E03.9 ACQUIRED HYPOTHYROIDISM: ICD-10-CM

## 2025-07-17 ENCOUNTER — APPOINTMENT (OUTPATIENT)
Dept: DERMATOLOGY | Facility: CLINIC | Age: 55
End: 2025-07-17
Payer: COMMERCIAL

## 2025-07-17 DIAGNOSIS — L81.4 LENTIGO: ICD-10-CM

## 2025-07-17 DIAGNOSIS — Z12.83 SCREENING EXAM FOR SKIN CANCER: ICD-10-CM

## 2025-07-17 DIAGNOSIS — L82.0 INFLAMED SEBORRHEIC KERATOSIS: ICD-10-CM

## 2025-07-17 DIAGNOSIS — L82.1 SEBORRHEIC KERATOSIS: ICD-10-CM

## 2025-07-17 DIAGNOSIS — L57.8 ACTINIC SKIN DAMAGE: ICD-10-CM

## 2025-07-17 DIAGNOSIS — L57.0 ACTINIC KERATOSIS: Primary | ICD-10-CM

## 2025-07-17 DIAGNOSIS — D22.9 MULTIPLE BENIGN NEVI: ICD-10-CM

## 2025-07-17 PROCEDURE — 99213 OFFICE O/P EST LOW 20 MIN: CPT | Performed by: DERMATOLOGY

## 2025-07-17 PROCEDURE — 1036F TOBACCO NON-USER: CPT | Performed by: DERMATOLOGY

## 2025-07-17 PROCEDURE — 17110 DESTRUCTION B9 LES UP TO 14: CPT | Performed by: DERMATOLOGY

## 2025-07-17 ASSESSMENT — DERMATOLOGY PATIENT ASSESSMENT: DO YOU HAVE ANY NEW OR CHANGING LESIONS: NO

## 2025-07-17 ASSESSMENT — DERMATOLOGY QUALITY OF LIFE (QOL) ASSESSMENT
RATE HOW BOTHERED YOU ARE BY EFFECTS OF YOUR SKIN PROBLEMS ON YOUR ACTIVITIES (EG, GOING OUT, ACCOMPLISHING WHAT YOU WANT, WORK ACTIVITIES OR YOUR RELATIONSHIPS WITH OTHERS): 0 - NEVER BOTHERED
DATE THE QUALITY-OF-LIFE ASSESSMENT WAS COMPLETED: 67403
RATE HOW BOTHERED YOU ARE BY SYMPTOMS OF YOUR SKIN PROBLEM (EG, ITCHING, STINGING BURNING, HURTING OR SKIN IRRITATION): 0 - NEVER BOTHERED
RATE HOW EMOTIONALLY BOTHERED YOU ARE BY YOUR SKIN PROBLEM (FOR EXAMPLE, WORRY, EMBARRASSMENT, FRUSTRATION): 0 - NEVER BOTHERED
ARE THERE EXCLUSIONS OR EXCEPTIONS FOR THE QUALITY OF LIFE ASSESSMENT: NO

## 2025-07-17 ASSESSMENT — PATIENT GLOBAL ASSESSMENT (PGA): PATIENT GLOBAL ASSESSMENT: PATIENT GLOBAL ASSESSMENT:  1 - CLEAR

## 2025-07-17 ASSESSMENT — ITCH NUMERIC RATING SCALE: HOW SEVERE IS YOUR ITCHING?: 0

## 2025-07-17 NOTE — PROGRESS NOTES
Subjective     Eveline Ruff is a 55 y.o. female who presents for the following: Skin Check. Last derm visit 7/11/24 for Full Skin Exam and inflamed seborrheic keratosis     Intake Questions  Do you have any new or changing Lesions?: No    Review of Systems:  No other skin or systemic complaints other than what is documented elsewhere in the note.    The following portions of the chart were reviewed this encounter and updated as appropriate:  Tobacco  Allergies  Meds  Problems  Med Hx  Surg Hx         Skin Cancer History  Biopsy Log Book  No skin cancers from Specimen Tracking.    Additional History  No history of skin cancer      Specialty Problems          Dermatology Problems    Primary focal hyperhidrosis, axilla        Objective   Well appearing patient in no apparent distress; mood and affect are within normal limits.    A full examination was performed including scalp, head, eyes, ears, nose, lips, neck, chest, axillae, abdomen, back, buttocks, bilateral upper extremities, bilateral lower extremities, hands, feet, fingers, toes, fingernails, and toenails. All findings within normal limits unless otherwise noted below.    Assessment/Plan   Skin Exam  1. ACTINIC KERATOSIS  Right Forehead  Erythematous scaly macule(s)  - noticed today for first time, though may have been present longer, intermittently appears, sometimes rough  - discussed the pre-cancerous nature, but due to subtle appearance ok to monitor x1 year and see if clears  2. INFLAMED SEBORRHEIC KERATOSIS  Left Inguinal Area  Stuck-on, waxy macule(s)/papule(s)/plaque(s) with comedo-like openings and milia-like cysts with surrounding erythema and crusting  -Patient requests cryotherapy today for these clinically inflamed lesions  -Possible side effects of liquid nitrogen treatment reviewed including formation of blisters, crusting, tenderness, scar, and discoloration which may be permanent.  - Destr of lesion - Left Inguinal Area  Complexity:  simple    Destruction method: cryotherapy    Informed consent: discussed and consent obtained    Lesion destroyed using liquid nitrogen: Yes    Outcome: patient tolerated procedure well with no complications      3. MULTIPLE BENIGN NEVI  Generalized  Brown and tan macules and papules with reassuring findings on dermoscopy  -These lesions have benign, reassuring patterns on dermoscopy  -Recommend continued self observation, and to contact the office if any changes in nevi are noticed  4. LENTIGO  Generalized  Tan macules  -Benign appearing on exam  -Reassurance, recommend observation  5. SEBORRHEIC KERATOSIS  Generalized  Stuck on, waxy macule(s)/papule(s)/plaque(s) with comedo-like openings and milia like cysts  -Discussed the nature of the diagnosis  -Reassurance, recommend continued observation  6. ACTINIC SKIN DAMAGE  Generalized  Background of photodamage with hyper- and hypo-pigmented macules on the skin  7. SCREENING EXAM FOR SKIN CANCER  Generalized    Full body skin exam  -No lesions concerning for malignancy on the remainder the skin exam today   - The ugly duckling sign was discussed. Monitor for any skin lesions that are different in color, shape, or size than others on body  -Sun protection was discussed. Recommend SPF 30+, hats with brims, sun protective clothing, and avoiding sun exposure between 10 AM and 2 PM whenever possible  -Recommend regular skin exams or sooner if new or changing lesions     This Visit  - Follow Up In Dermatology - Established Patient  - Follow Up In Dermatology - Established Patient    Follow up 1 year Full Skin Exam

## 2025-07-17 NOTE — Clinical Note
- noticed today for first time, though may have been present longer, intermittently appears, sometimes rough  - discussed the pre-cancerous nature, but due to subtle appearance ok to monitor x1 year and see if clears

## 2025-07-19 LAB
ALBUMIN SERPL-MCNC: 5 G/DL (ref 3.6–5.1)
ALBUMIN/GLOB SERPL: 1.7 (CALC) (ref 1–2.5)
ALP SERPL-CCNC: 54 U/L (ref 37–153)
ALT SERPL-CCNC: 22 U/L (ref 6–29)
AST SERPL-CCNC: 23 U/L (ref 10–35)
BILIRUB DIRECT SERPL-MCNC: 0.1 MG/DL
BILIRUB INDIRECT SERPL-MCNC: 0.6 MG/DL (CALC) (ref 0.2–1.2)
BILIRUB SERPL-MCNC: 0.7 MG/DL (ref 0.2–1.2)
CHOLEST SERPL-MCNC: 187 MG/DL
CHOLEST/HDLC SERPL: 2.8 (CALC)
GLOBULIN SER CALC-MCNC: 3 G/DL (CALC) (ref 1.9–3.7)
HDLC SERPL-MCNC: 68 MG/DL
LDLC SERPL CALC-MCNC: 98 MG/DL (CALC)
NONHDLC SERPL-MCNC: 119 MG/DL (CALC)
PROT SERPL-MCNC: 8 G/DL (ref 6.1–8.1)
TRIGL SERPL-MCNC: 118 MG/DL
TSH SERPL-ACNC: 2.49 MIU/L

## 2025-07-25 PROCEDURE — RXMED WILLOW AMBULATORY MEDICATION CHARGE

## 2025-07-27 PROCEDURE — RXMED WILLOW AMBULATORY MEDICATION CHARGE

## 2025-07-29 ENCOUNTER — PHARMACY VISIT (OUTPATIENT)
Dept: PHARMACY | Facility: CLINIC | Age: 55
End: 2025-07-29
Payer: COMMERCIAL

## 2025-07-30 ENCOUNTER — PHARMACY VISIT (OUTPATIENT)
Dept: PHARMACY | Facility: CLINIC | Age: 55
End: 2025-07-30
Payer: COMMERCIAL

## 2025-07-30 DIAGNOSIS — E03.9 ACQUIRED HYPOTHYROIDISM: ICD-10-CM

## 2025-07-31 RX ORDER — LEVOTHYROXINE SODIUM 25 UG/1
25 TABLET ORAL DAILY
Qty: 90 TABLET | Refills: 3 | Status: SHIPPED | OUTPATIENT
Start: 2025-07-31 | End: 2026-07-31

## 2025-08-11 ENCOUNTER — APPOINTMENT (OUTPATIENT)
Dept: PRIMARY CARE | Facility: CLINIC | Age: 55
End: 2025-08-11
Payer: COMMERCIAL

## 2025-08-11 VITALS
OXYGEN SATURATION: 97 % | HEIGHT: 60 IN | WEIGHT: 133 LBS | BODY MASS INDEX: 26.11 KG/M2 | HEART RATE: 96 BPM | DIASTOLIC BLOOD PRESSURE: 70 MMHG | TEMPERATURE: 98.2 F | SYSTOLIC BLOOD PRESSURE: 110 MMHG

## 2025-08-11 DIAGNOSIS — E03.9 ACQUIRED HYPOTHYROIDISM: ICD-10-CM

## 2025-08-11 PROCEDURE — 3074F SYST BP LT 130 MM HG: CPT | Performed by: FAMILY MEDICINE

## 2025-08-11 PROCEDURE — 1036F TOBACCO NON-USER: CPT | Performed by: FAMILY MEDICINE

## 2025-08-11 PROCEDURE — 3078F DIAST BP <80 MM HG: CPT | Performed by: FAMILY MEDICINE

## 2025-08-11 PROCEDURE — 99213 OFFICE O/P EST LOW 20 MIN: CPT | Performed by: FAMILY MEDICINE

## 2025-08-11 PROCEDURE — 3008F BODY MASS INDEX DOCD: CPT | Performed by: FAMILY MEDICINE

## 2025-08-11 RX ORDER — LEVOTHYROXINE SODIUM 25 UG/1
25 TABLET ORAL DAILY
Qty: 90 TABLET | Refills: 3 | Status: SHIPPED | OUTPATIENT
Start: 2025-08-11 | End: 2026-08-11

## 2025-08-11 ASSESSMENT — ENCOUNTER SYMPTOMS
BLOOD IN STOOL: 0
UNEXPECTED WEIGHT CHANGE: 0
FATIGUE: 0
TREMORS: 0
WEAKNESS: 0
SINUS PAIN: 0
TROUBLE SWALLOWING: 0
DYSURIA: 0
PALPITATIONS: 0
COUGH: 0
FEVER: 0
DYSPHORIC MOOD: 0
BRUISES/BLEEDS EASILY: 0
VOMITING: 0
DIARRHEA: 0
JOINT SWELLING: 0
NAUSEA: 0
SHORTNESS OF BREATH: 0
LIGHT-HEADEDNESS: 0
ABDOMINAL PAIN: 0
HEMATURIA: 0

## 2025-08-11 ASSESSMENT — PAIN SCALES - GENERAL: PAINLEVEL_OUTOF10: 0-NO PAIN

## 2025-08-18 PROCEDURE — RXMED WILLOW AMBULATORY MEDICATION CHARGE

## 2025-08-21 ENCOUNTER — PHARMACY VISIT (OUTPATIENT)
Dept: PHARMACY | Facility: CLINIC | Age: 55
End: 2025-08-21
Payer: COMMERCIAL

## 2025-09-22 ENCOUNTER — APPOINTMENT (OUTPATIENT)
Dept: INTEGRATIVE MEDICINE | Facility: CLINIC | Age: 55
End: 2025-09-22
Payer: COMMERCIAL

## 2026-07-28 ENCOUNTER — APPOINTMENT (OUTPATIENT)
Dept: DERMATOLOGY | Facility: CLINIC | Age: 56
End: 2026-07-28
Payer: COMMERCIAL